# Patient Record
Sex: FEMALE | Race: WHITE | NOT HISPANIC OR LATINO | Employment: FULL TIME | URBAN - METROPOLITAN AREA
[De-identification: names, ages, dates, MRNs, and addresses within clinical notes are randomized per-mention and may not be internally consistent; named-entity substitution may affect disease eponyms.]

---

## 2019-07-26 ENCOUNTER — HOSPITAL ENCOUNTER (EMERGENCY)
Facility: CLINIC | Age: 39
Discharge: HOME | End: 2019-07-26
Attending: EMERGENCY MEDICINE | Admitting: EMERGENCY MEDICINE
Payer: COMMERCIAL

## 2019-07-26 ENCOUNTER — APPOINTMENT (OUTPATIENT)
Dept: GENERAL RADIOLOGY | Facility: CLINIC | Age: 39
End: 2019-07-26
Attending: EMERGENCY MEDICINE
Payer: COMMERCIAL

## 2019-07-26 VITALS
RESPIRATION RATE: 18 BRPM | HEIGHT: 67 IN | SYSTOLIC BLOOD PRESSURE: 131 MMHG | BODY MASS INDEX: 25.11 KG/M2 | WEIGHT: 160 LBS | OXYGEN SATURATION: 95 % | HEART RATE: 78 BPM | TEMPERATURE: 98.1 F | DIASTOLIC BLOOD PRESSURE: 77 MMHG

## 2019-07-26 DIAGNOSIS — R07.89 ATYPICAL CHEST PAIN: ICD-10-CM

## 2019-07-26 LAB
ALBUMIN SERPL-MCNC: 3.6 G/DL (ref 3.4–5)
ALP SERPL-CCNC: 63 U/L (ref 40–150)
ALT SERPL W P-5'-P-CCNC: 11 U/L (ref 0–50)
ANION GAP SERPL CALCULATED.3IONS-SCNC: 7 MMOL/L (ref 3–14)
AST SERPL W P-5'-P-CCNC: 11 U/L (ref 0–45)
BASOPHILS # BLD AUTO: 0.1 10E9/L (ref 0–0.2)
BASOPHILS NFR BLD AUTO: 0.7 %
BILIRUB SERPL-MCNC: 0.4 MG/DL (ref 0.2–1.3)
BUN SERPL-MCNC: 16 MG/DL (ref 7–30)
CALCIUM SERPL-MCNC: 8.8 MG/DL (ref 8.5–10.1)
CHLORIDE SERPL-SCNC: 109 MMOL/L (ref 94–109)
CO2 SERPL-SCNC: 26 MMOL/L (ref 20–32)
CREAT SERPL-MCNC: 0.82 MG/DL (ref 0.52–1.04)
D DIMER PPP FEU-MCNC: 0.3 UG/ML FEU (ref 0–0.5)
DIFFERENTIAL METHOD BLD: NORMAL
EOSINOPHIL # BLD AUTO: 0.1 10E9/L (ref 0–0.7)
EOSINOPHIL NFR BLD AUTO: 0.9 %
ERYTHROCYTE [DISTWIDTH] IN BLOOD BY AUTOMATED COUNT: 13.1 % (ref 10–15)
GFR SERPL CREATININE-BSD FRML MDRD: 90 ML/MIN/{1.73_M2}
GLUCOSE SERPL-MCNC: 105 MG/DL (ref 70–99)
HCT VFR BLD AUTO: 41.1 % (ref 35–47)
HGB BLD-MCNC: 13.1 G/DL (ref 11.7–15.7)
IMM GRANULOCYTES # BLD: 0 10E9/L (ref 0–0.4)
IMM GRANULOCYTES NFR BLD: 0.4 %
INR PPP: 1 (ref 0.86–1.14)
INTERPRETATION ECG - MUSE: NORMAL
INTERPRETATION ECG - MUSE: NORMAL
LYMPHOCYTES # BLD AUTO: 2.2 10E9/L (ref 0.8–5.3)
LYMPHOCYTES NFR BLD AUTO: 27.5 %
MAGNESIUM SERPL-MCNC: 2.1 MG/DL (ref 1.6–2.3)
MCH RBC QN AUTO: 28.1 PG (ref 26.5–33)
MCHC RBC AUTO-ENTMCNC: 31.9 G/DL (ref 31.5–36.5)
MCV RBC AUTO: 88 FL (ref 78–100)
MONOCYTES # BLD AUTO: 0.4 10E9/L (ref 0–1.3)
MONOCYTES NFR BLD AUTO: 4.4 %
NEUTROPHILS # BLD AUTO: 5.4 10E9/L (ref 1.6–8.3)
NEUTROPHILS NFR BLD AUTO: 66.1 %
NRBC # BLD AUTO: 0 10*3/UL
NRBC BLD AUTO-RTO: 0 /100
PLATELET # BLD AUTO: 320 10E9/L (ref 150–450)
POTASSIUM SERPL-SCNC: 3.6 MMOL/L (ref 3.4–5.3)
PROT SERPL-MCNC: 7.5 G/DL (ref 6.8–8.8)
RBC # BLD AUTO: 4.66 10E12/L (ref 3.8–5.2)
SODIUM SERPL-SCNC: 142 MMOL/L (ref 133–144)
TROPONIN I SERPL-MCNC: <0.015 UG/L (ref 0–0.04)
TROPONIN I SERPL-MCNC: <0.015 UG/L (ref 0–0.04)
WBC # BLD AUTO: 8.1 10E9/L (ref 4–11)

## 2019-07-26 PROCEDURE — 85379 FIBRIN DEGRADATION QUANT: CPT | Performed by: EMERGENCY MEDICINE

## 2019-07-26 PROCEDURE — 96361 HYDRATE IV INFUSION ADD-ON: CPT

## 2019-07-26 PROCEDURE — 83735 ASSAY OF MAGNESIUM: CPT | Performed by: EMERGENCY MEDICINE

## 2019-07-26 PROCEDURE — 93005 ELECTROCARDIOGRAM TRACING: CPT

## 2019-07-26 PROCEDURE — 80053 COMPREHEN METABOLIC PANEL: CPT | Performed by: EMERGENCY MEDICINE

## 2019-07-26 PROCEDURE — 96360 HYDRATION IV INFUSION INIT: CPT

## 2019-07-26 PROCEDURE — 84484 ASSAY OF TROPONIN QUANT: CPT | Mod: 91 | Performed by: EMERGENCY MEDICINE

## 2019-07-26 PROCEDURE — 99285 EMERGENCY DEPT VISIT HI MDM: CPT | Mod: 25

## 2019-07-26 PROCEDURE — 85610 PROTHROMBIN TIME: CPT | Performed by: EMERGENCY MEDICINE

## 2019-07-26 PROCEDURE — 85025 COMPLETE CBC W/AUTO DIFF WBC: CPT | Performed by: EMERGENCY MEDICINE

## 2019-07-26 PROCEDURE — 71046 X-RAY EXAM CHEST 2 VIEWS: CPT

## 2019-07-26 PROCEDURE — 25000128 H RX IP 250 OP 636: Performed by: EMERGENCY MEDICINE

## 2019-07-26 PROCEDURE — 93005 ELECTROCARDIOGRAM TRACING: CPT | Mod: 76

## 2019-07-26 RX ORDER — ETONOGESTREL AND ETHINYL ESTRADIOL VAGINAL RING .015; .12 MG/D; MG/D
1 RING VAGINAL
COMMUNITY
End: 2023-09-10

## 2019-07-26 RX ADMIN — SODIUM CHLORIDE 1000 ML: 9 INJECTION, SOLUTION INTRAVENOUS at 10:28

## 2019-07-26 ASSESSMENT — ENCOUNTER SYMPTOMS
FATIGUE: 1
APPETITE CHANGE: 1
FEVER: 0
SHORTNESS OF BREATH: 1
WEAKNESS: 1
ACTIVITY CHANGE: 1
DIZZINESS: 1
NAUSEA: 0
VOMITING: 0

## 2019-07-26 ASSESSMENT — MIFFLIN-ST. JEOR: SCORE: 1438.39

## 2019-07-26 NOTE — ED TRIAGE NOTES
Presents with 2 week hx of chest pressure, weakness, shortness of breath and fatigue.  States travels a lot and has a lot of stress.  ABCD intact

## 2019-07-26 NOTE — ED PROVIDER NOTES
History     Chief Complaint:  Chest Pain and Shortness of Breath    HPI   Bernardo Burgos is a 38 year old female who presents with chest pain and shortness of breath. The patient reports mid sternal chest pressure for the past several weeks. She notes the pain was initially mild but has been getting worse. The patient notes it feels like how her chest felt after she had recovered from pneumonia. The patient notes she has been stressed at work, and she states 'she tends to carry stress in her chest.' The patient notes rest and Ibuprofen seem to resolve her pain. The patient notes some shortness of breath when talking and exercising. She also notes weakness in her legs, mild dizziness, fatigue, and loss of appetite. The patient called her primary care physician nursing line and was told to come to the ED. The patient denies vomiting, nausea, or fever.  Of note, the patient is on her menstrual cycle last week and started using a Nuvaring 2 months ago. She also reports she travels a lot for work, and has been sleeping in different hotels leading to loss of sleep.      CARDIAC RISK FACTORS:  Tobacco:   No  Hypertension:   No  Hyperlipidemia:  No  Diabetes:   No  Family History:  Yes grandfather in 50s    PE/DVT RISK FACTORS:  Hormones:   Yes  Tobacco:   No  Cancer:   No  Travel:   Yes  Family history:  No    Allergies:  No known allergies.     Medications:    Nuvaring    Past Medical History:    UTI  Varicella  Pneumonia    Past Surgical History:    C section  [place] teeth  Tubal ligation    Family History:    Mother: depression, bipolar  Maternal grandfather: heart disease, stroke    Social History:  Smoking Status: Never Smoker  Smokeless Tobacco: Never Used  Alcohol Use: Positive    Drug use: No    Review of Systems   Constitutional: Positive for activity change (less sleep), appetite change and fatigue. Negative for fever.   Respiratory: Positive for shortness of breath.    Cardiovascular: Positive for chest  "pain.   Gastrointestinal: Negative for nausea and vomiting.   Neurological: Positive for dizziness and weakness (in legs).   All other systems reviewed and are negative.    Physical Exam     Patient Vitals for the past 24 hrs:   BP Temp Temp src Pulse Heart Rate Resp SpO2 Height Weight   07/26/19 1230 126/68 -- -- 75 78 -- 96 % -- --   07/26/19 1215 124/71 -- -- 77 75 -- 97 % -- --   07/26/19 1200 127/64 -- -- 83 78 -- 97 % -- --   07/26/19 1145 124/59 -- -- 76 77 -- 97 % -- --   07/26/19 1130 126/77 -- -- 84 83 -- 99 % -- --   07/26/19 1115 126/76 -- -- 93 80 -- 98 % -- --   07/26/19 1100 117/66 -- -- 83 101 -- (!) 75 % -- --   07/26/19 1045 -- -- -- -- 89 -- 100 % -- --   07/26/19 1030 131/66 -- -- 87 91 -- 99 % -- --   07/26/19 1015 165/84 -- -- 99 -- -- 100 % -- --   07/26/19 1013 (!) 170/93 98.1  F (36.7  C) Oral 104 -- 18 99 % 1.702 m (5' 7\") 72.6 kg (160 lb)     Physical Exam   Constitutional: She appears well-developed and well-nourished.   HENT:   Right Ear: External ear normal.   Left Ear: External ear normal.   Mouth/Throat: Oropharynx is clear and moist. No oropharyngeal exudate.   TM's clear bilaterally   Eyes: Pupils are equal, round, and reactive to light. Conjunctivae are normal. No scleral icterus.   Neck: Normal range of motion. Neck supple. No JVD present.   Cardiovascular: Regular rhythm, normal heart sounds and intact distal pulses. Tachycardia present. Exam reveals no gallop and no friction rub.   No murmur heard.  Pulmonary/Chest: Effort normal and breath sounds normal. No respiratory distress. She has no wheezes. She has no rales.   Abdominal: Soft. Bowel sounds are normal. She exhibits no distension and no mass. There is no tenderness.   Musculoskeletal: Normal range of motion. She exhibits no edema.   Lymphadenopathy:     She has no cervical adenopathy.   Neurological: She is alert. No cranial nerve deficit.   Skin: Skin is warm and dry. No rash noted.   Psychiatric: She has a normal mood " and affect.     Emergency Department Course   ECG:  ECG taken at 1015, ECG read at 1022  Normal sinus rhythm with sinus arrhythmia  Nonspecific ST abnormality  Abnormal ECG  Rate 98 bpm. MN interval 114 ms. QRS duration 78 ms. QT/QTc 334/426 ms. P-R-T axes 66 4 4.    ECG:  ECG taken at 1206, ECG read at 1208  Normal sinus rhythm  Normal ECG  Rate 79 bpm. MN interval 114 ms. QRS duration 80 ms. QT/QTc 366/419 ms. P-R-T axes 34 1 -3.    Imaging:  Radiology findings were communicated with the patient who voiced understanding of the findings.    XR Chest  Normal two views of the chest.   CHELSY CASE MD    Laboratory:  Laboratory findings were communicated with the patient who voiced understanding of the findings.    CBC: WBC 8.1, HGB 13.1,   CMP: glucose 105(H) o/w WNL (Creatinine 0.82)  D Dimer (Collected 1025): 0.3  INR:1.00  Troponin (Collected 1025): <0.015  Magnesium: 2.1    Troponin (Collected 1219): <0.015    Interventions:  1028 NS 1000 mL IV    Emergency Department Course:  Nursing notes and vitals reviewed.    1015 I performed an exam of the patient as documented above.     1025 IV was inserted and blood was drawn for laboratory testing, results above.    1046 The patient was sent for a XR Chest while in the emergency department, results above.     1114 I returned to update the patient.    1219 Repeat troponin obtained, results above.     1313 Findings and plan explained to the Patient. Patient discharged home with instructions regarding supportive care, medications, and reasons to return. The importance of close follow-up was reviewed.     Impression & Plan    Medical Decision Making:  Bernardo Burgos is a 38 year old female who presents to the emergency department today for evaluation of 2 weeks of chest tightness and pain and has been feeling weak. She has been under a lot of stress and does feel that this is contributing. Workup so far is negative including initial troponin which likely  rules out this being cardiac given how long her duration of pain has been. There was some slight abnormality on her EKG mainly with possible ST depression. The EKG was preformed when she was very hypertensive on arrival. I repeated her EKG when she become more calm with a normal blood pressure and it is completely normal now. I did get a second troponin which is negative still which certainly rules out this being cardiac. I discussed setting her up with an outpatient stress test as well as a blood pressure monitor she can use at home. She was given instructions for how to check her blood pressure. She is comfortable with the plan and will follow up with primary care physician.     Diagnosis:    ICD-10-CM    1. Atypical chest pain R07.89 Echo Stress Echocardiogram        Disposition:   The patient is discharged to home.    Discharge Medications:  No discharge medication.     Scribe Disclosure:  I, Marilynn Richardson, am serving as a scribe at 10:10 AM on 7/26/2019 to document services personally performed by Prerna Stephens MD based on my observations and the provider's statements to me.  [facility] EMERGENCY DEPARTMENT       Prerna Stephens MD  07/26/19 0887

## 2019-07-26 NOTE — ED AVS SNAPSHOT
[facility] Emergency Department  [address]  [facility] [address]  Phone:  [phone]  Fax:  [phone]                                    Bernardo Burgos   MRN: [id number]    Department:  [facility] Emergency Department   Date of Visit:  7/26/2019           After Visit Summary Signature Page    I have received my discharge instructions, and my questions have been answered. I have discussed any challenges I see with this plan with the nurse or doctor.    ..........................................................................................................................................  Patient/Patient Representative Signature      ..........................................................................................................................................  Patient Representative Print Name and Relationship to Patient    ..................................................               ................................................  Date                                   Time    ..........................................................................................................................................  Reviewed by Signature/Title    ...................................................              ..............................................  Date                                               Time          22EPIC Rev 08/18

## 2019-07-30 ENCOUNTER — HOSPITAL ENCOUNTER (OUTPATIENT)
Dept: CARDIOLOGY | Facility: CLINIC | Age: 39
Discharge: HOME | End: 2019-07-30
Attending: EMERGENCY MEDICINE | Admitting: EMERGENCY MEDICINE
Payer: COMMERCIAL

## 2019-07-30 DIAGNOSIS — R07.89 ATYPICAL CHEST PAIN: ICD-10-CM

## 2019-07-30 PROCEDURE — 93325 DOPPLER ECHO COLOR FLOW MAPG: CPT | Mod: 26 | Performed by: INTERNAL MEDICINE

## 2019-07-30 PROCEDURE — 93350 STRESS TTE ONLY: CPT | Mod: TC

## 2019-07-30 PROCEDURE — 93321 DOPPLER ECHO F-UP/LMTD STD: CPT | Mod: 26 | Performed by: INTERNAL MEDICINE

## 2019-07-30 PROCEDURE — 93350 STRESS TTE ONLY: CPT | Mod: 26 | Performed by: INTERNAL MEDICINE

## 2019-07-30 PROCEDURE — 93016 CV STRESS TEST SUPVJ ONLY: CPT | Performed by: INTERNAL MEDICINE

## 2019-07-30 PROCEDURE — 93018 CV STRESS TEST I&R ONLY: CPT | Performed by: INTERNAL MEDICINE

## 2019-09-26 ENCOUNTER — HOSPITAL ENCOUNTER (OUTPATIENT)
Dept: ULTRASOUND IMAGING | Facility: CLINIC | Age: 39
Discharge: HOME | End: 2019-09-26
Attending: INTERNAL MEDICINE | Admitting: INTERNAL MEDICINE
Payer: COMMERCIAL

## 2019-09-26 DIAGNOSIS — R07.9 CHEST PAIN, UNSPECIFIED TYPE: ICD-10-CM

## 2019-09-26 DIAGNOSIS — R10.11 RUQ PAIN: ICD-10-CM

## 2019-09-26 DIAGNOSIS — R14.0 BLOATING: ICD-10-CM

## 2019-09-26 PROCEDURE — 76705 ECHO EXAM OF ABDOMEN: CPT

## 2019-10-31 ENCOUNTER — HOSPITAL ENCOUNTER (OUTPATIENT)
Facility: CLINIC | Age: 39
Setting detail: OBSERVATION
Discharge: HOME | End: 2019-11-01
Attending: EMERGENCY MEDICINE | Admitting: HOSPITALIST
Payer: COMMERCIAL

## 2019-10-31 DIAGNOSIS — R19.7 BLOODY DIARRHEA: ICD-10-CM

## 2019-10-31 DIAGNOSIS — R53.83 OTHER FATIGUE: ICD-10-CM

## 2019-10-31 LAB
ALBUMIN SERPL-MCNC: 3 G/DL (ref 3.4–5)
ALP SERPL-CCNC: 92 U/L (ref 40–150)
ALT SERPL W P-5'-P-CCNC: 10 U/L (ref 0–50)
ANION GAP SERPL CALCULATED.3IONS-SCNC: 8 MMOL/L (ref 3–14)
AST SERPL W P-5'-P-CCNC: 25 U/L (ref 0–45)
BASOPHILS # BLD AUTO: 0 10E9/L (ref 0–0.2)
BASOPHILS NFR BLD AUTO: 0.6 %
BILIRUB SERPL-MCNC: 0.4 MG/DL (ref 0.2–1.3)
BUN SERPL-MCNC: 8 MG/DL (ref 7–30)
C DIFF TOX B STL QL: NEGATIVE
CALCIUM SERPL-MCNC: 8.7 MG/DL (ref 8.5–10.1)
CHLORIDE SERPL-SCNC: 98 MMOL/L (ref 94–109)
CO2 SERPL-SCNC: 26 MMOL/L (ref 20–32)
CREAT SERPL-MCNC: 0.99 MG/DL (ref 0.52–1.04)
DIFFERENTIAL METHOD BLD: NORMAL
EOSINOPHIL # BLD AUTO: 0.1 10E9/L (ref 0–0.7)
EOSINOPHIL NFR BLD AUTO: 1.4 %
ERYTHROCYTE [DISTWIDTH] IN BLOOD BY AUTOMATED COUNT: 11.9 % (ref 10–15)
GFR SERPL CREATININE-BSD FRML MDRD: 72 ML/MIN/{1.73_M2}
GLUCOSE BLDC GLUCOMTR-MCNC: 93 MG/DL (ref 70–99)
GLUCOSE SERPL-MCNC: 93 MG/DL (ref 70–99)
HCT VFR BLD AUTO: 43.2 % (ref 35–47)
HEMOCCULT STL QL: POSITIVE
HGB BLD-MCNC: 14.1 G/DL (ref 11.7–15.7)
IMM GRANULOCYTES # BLD: 0 10E9/L (ref 0–0.4)
IMM GRANULOCYTES NFR BLD: 0.2 %
LYMPHOCYTES # BLD AUTO: 2.2 10E9/L (ref 0.8–5.3)
LYMPHOCYTES NFR BLD AUTO: 33.4 %
MCH RBC QN AUTO: 27.4 PG (ref 26.5–33)
MCHC RBC AUTO-ENTMCNC: 32.6 G/DL (ref 31.5–36.5)
MCV RBC AUTO: 84 FL (ref 78–100)
MONOCYTES # BLD AUTO: 0.8 10E9/L (ref 0–1.3)
MONOCYTES NFR BLD AUTO: 12.7 %
NEUTROPHILS # BLD AUTO: 3.3 10E9/L (ref 1.6–8.3)
NEUTROPHILS NFR BLD AUTO: 51.7 %
NRBC # BLD AUTO: 0 10*3/UL
NRBC BLD AUTO-RTO: 0 /100
PLATELET # BLD AUTO: 368 10E9/L (ref 150–450)
POTASSIUM SERPL-SCNC: 3.5 MMOL/L (ref 3.4–5.3)
PROT SERPL-MCNC: 7.4 G/DL (ref 6.8–8.8)
RBC # BLD AUTO: 5.15 10E12/L (ref 3.8–5.2)
SODIUM SERPL-SCNC: 132 MMOL/L (ref 133–144)
SPECIMEN SOURCE: NORMAL
WBC # BLD AUTO: 6.4 10E9/L (ref 4–11)

## 2019-10-31 PROCEDURE — 25000128 H RX IP 250 OP 636: Performed by: EMERGENCY MEDICINE

## 2019-10-31 PROCEDURE — 85025 COMPLETE CBC W/AUTO DIFF WBC: CPT | Performed by: EMERGENCY MEDICINE

## 2019-10-31 PROCEDURE — 99285 EMERGENCY DEPT VISIT HI MDM: CPT | Mod: 25

## 2019-10-31 PROCEDURE — 96361 HYDRATE IV INFUSION ADD-ON: CPT

## 2019-10-31 PROCEDURE — 87493 C DIFF AMPLIFIED PROBE: CPT | Performed by: EMERGENCY MEDICINE

## 2019-10-31 PROCEDURE — G0378 HOSPITAL OBSERVATION PER HR: HCPCS

## 2019-10-31 PROCEDURE — 00000146 ZZHCL STATISTIC GLUCOSE BY METER IP

## 2019-10-31 PROCEDURE — 87209 SMEAR COMPLEX STAIN: CPT | Performed by: EMERGENCY MEDICINE

## 2019-10-31 PROCEDURE — 87177 OVA AND PARASITES SMEARS: CPT | Performed by: EMERGENCY MEDICINE

## 2019-10-31 PROCEDURE — 80053 COMPREHEN METABOLIC PANEL: CPT | Performed by: EMERGENCY MEDICINE

## 2019-10-31 PROCEDURE — 87506 IADNA-DNA/RNA PROBE TQ 6-11: CPT | Performed by: EMERGENCY MEDICINE

## 2019-10-31 PROCEDURE — 25800030 ZZH RX IP 258 OP 636: Performed by: EMERGENCY MEDICINE

## 2019-10-31 PROCEDURE — 25800030 ZZH RX IP 258 OP 636: Performed by: HOSPITALIST

## 2019-10-31 PROCEDURE — 96360 HYDRATION IV INFUSION INIT: CPT

## 2019-10-31 PROCEDURE — 82272 OCCULT BLD FECES 1-3 TESTS: CPT | Performed by: EMERGENCY MEDICINE

## 2019-10-31 PROCEDURE — 99220 ZZC INITIAL OBSERVATION CARE,LEVL III: CPT | Performed by: HOSPITALIST

## 2019-10-31 RX ORDER — ONDANSETRON 4 MG/1
4 TABLET, ORALLY DISINTEGRATING ORAL EVERY 6 HOURS PRN
Status: DISCONTINUED | OUTPATIENT
Start: 2019-10-31 | End: 2019-11-01 | Stop reason: HOSPADM

## 2019-10-31 RX ORDER — NALOXONE HYDROCHLORIDE 0.4 MG/ML
.1-.4 INJECTION, SOLUTION INTRAMUSCULAR; INTRAVENOUS; SUBCUTANEOUS
Status: DISCONTINUED | OUTPATIENT
Start: 2019-10-31 | End: 2019-11-01 | Stop reason: HOSPADM

## 2019-10-31 RX ORDER — SODIUM CHLORIDE, SODIUM LACTATE, POTASSIUM CHLORIDE, CALCIUM CHLORIDE 600; 310; 30; 20 MG/100ML; MG/100ML; MG/100ML; MG/100ML
INJECTION, SOLUTION INTRAVENOUS CONTINUOUS
Status: DISCONTINUED | OUTPATIENT
Start: 2019-10-31 | End: 2019-11-01 | Stop reason: HOSPADM

## 2019-10-31 RX ORDER — LANSOPRAZOLE 30 MG/1
30 CAPSULE, DELAYED RELEASE ORAL
COMMUNITY
Start: 2019-11-01

## 2019-10-31 RX ORDER — ACETAMINOPHEN 325 MG/1
650 TABLET ORAL EVERY 4 HOURS PRN
Status: DISCONTINUED | OUTPATIENT
Start: 2019-10-31 | End: 2019-11-01 | Stop reason: HOSPADM

## 2019-10-31 RX ORDER — ONDANSETRON 2 MG/ML
4 INJECTION INTRAMUSCULAR; INTRAVENOUS EVERY 6 HOURS PRN
Status: DISCONTINUED | OUTPATIENT
Start: 2019-10-31 | End: 2019-11-01 | Stop reason: HOSPADM

## 2019-10-31 RX ORDER — LOPERAMIDE HYDROCHLORIDE AND SIMETHICONE 2; 125 MG/1; MG/1
1 TABLET ORAL 2 TIMES DAILY PRN
COMMUNITY
End: 2023-09-10

## 2019-10-31 RX ORDER — LIDOCAINE 40 MG/G
CREAM TOPICAL
Status: DISCONTINUED | OUTPATIENT
Start: 2019-10-31 | End: 2019-11-01 | Stop reason: HOSPADM

## 2019-10-31 RX ORDER — MECOBALAMIN 5000 MCG
30 TABLET,DISINTEGRATING ORAL
Status: DISCONTINUED | OUTPATIENT
Start: 2019-11-01 | End: 2019-11-01 | Stop reason: HOSPADM

## 2019-10-31 RX ORDER — ACETAMINOPHEN 650 MG/1
650 SUPPOSITORY RECTAL EVERY 4 HOURS PRN
Status: DISCONTINUED | OUTPATIENT
Start: 2019-10-31 | End: 2019-11-01 | Stop reason: HOSPADM

## 2019-10-31 RX ADMIN — SODIUM CHLORIDE, POTASSIUM CHLORIDE, SODIUM LACTATE AND CALCIUM CHLORIDE: 600; 310; 30; 20 INJECTION, SOLUTION INTRAVENOUS at 21:55

## 2019-10-31 RX ADMIN — DEXTROSE AND SODIUM CHLORIDE 1000 ML: 5; 900 INJECTION, SOLUTION INTRAVENOUS at 17:24

## 2019-10-31 RX ADMIN — SODIUM CHLORIDE 1000 ML: 9 INJECTION, SOLUTION INTRAVENOUS at 14:48

## 2019-10-31 ASSESSMENT — ENCOUNTER SYMPTOMS
BLOOD IN STOOL: 1
VOMITING: 1
DIZZINESS: 1
NAUSEA: 1
WEAKNESS: 1
MYALGIAS: 1
FEVER: 0
DIARRHEA: 1

## 2019-10-31 NOTE — ED NOTES
[facility]  ED Nurse Handoff Report    Bernardo Burgos is a 39 year old female   ED Chief complaint: diarrhea x 1 week, blood today  . ED Diagnosis:   Final diagnoses:   Bloody diarrhea   Other fatigue     Allergies: No Known Allergies    Code Status: Full Code  Activity level - Baseline/Home:  Independent. Activity Level - Current:   Stand by Assist. Lift room needed: No. Bariatric: No   Needed: No   Isolation: No. Infection: Not Applicable.     Vital Signs:   Vitals:    10/31/19 1404 10/31/19 1515 10/31/19 1730   BP: (!) 141/82 131/87 119/77   Pulse: 116 89 88   Resp: 20     Temp: 96.4  F (35.8  C)     TempSrc: Temporal     SpO2: 95% 99% 98%       Cardiac Rhythm:  ,      Pain level: 0-10 Pain Scale: 1  Patient confused: No. Patient Falls Risk: Yes.   Elimination Status: Has voided   Patient Report - Initial Complaint: diarrhea. Focused Assessment: Gastrointestinal - GI WDL: all  Nausea/Vomiting Signs/Symptoms: nausea intermittent; emesis  Stool Color: red, bright  GI Signs/Symptoms: diarrhea; nausea; vomiting   Tests Performed: labs. Abnormal Results:   Labs Ordered and Resulted from Time of ED Arrival Up to the Time of Departure from the ED   COMPREHENSIVE METABOLIC PANEL - Abnormal; Notable for the following components:       Result Value    Sodium 132 (*)     Albumin 3.0 (*)     All other components within normal limits   OCCULT BLOOD STOOL - Abnormal; Notable for the following components:    Occult Blood Positive (*)     All other components within normal limits   CBC WITH PLATELETS DIFFERENTIAL   GLUCOSE BY METER   PERIPHERAL IV CATHETER   ENTERIC BACTERIA AND VIRUS PANEL BY [facility] STOOL   CLOSTRIDIUM DIFFICILE TOXIN B   OVA AND PARASITE EXAM ROUTINE     .   Treatments provided: fluids  Family Comments: spouse was present, since left  OBS brochure/video discussed/provided to patient:  Yes  ED Medications:   Medications   0.9% sodium chloride BOLUS (1,000 mLs Intravenous New Bag  10/31/19 1448)   dextrose 5% and 0.9% NaCl infusion (1,000 mLs Intravenous New Bag 10/31/19 0594)     Drips infusing:  No  For the majority of the shift, the patient's behavior Green. Interventions performed were .     Severe Sepsis OR Septic Shock Diagnosis Present: No      ED Nurse Name/Phone Number: Alicia Morales RN,   6:55 PM    RECEIVING UNIT ED HANDOFF REVIEW    Above ED Nurse Handoff Report was reviewed: Yes  Reviewed by: Dale Glez RN on October 31, 2019 at 7:29 PM

## 2019-10-31 NOTE — ED PROVIDER NOTES
History     Chief Complaint:  Diarrhea; Hematochezia    HPI   Bernardo Burgos is a 39 year old female with a history of IBS who presents with  for evaluation of greater than 10 episodes a day of diarrhea, associated with abdominal cramping, generalized weakness, dizziness, and hematochezia, that began about 1 week ago. Patient has been traveling a lot for work for the past 2 months and about a month ago she presented to [facility] for chest pain and abdominal pain, and was treated for GERD and IBS. They tested for Crohn's disease, cancer, ulcerative colitis, celiac disease, among others, which all came back as normal. They recommended she had a diet change where she reduced her diary and gluten. 2 weeks ago she went to a cave in [facility]. A day before she began having diarrhea, she had dairy and gluten again. The next day she began having diarrhea in the morning, but attributed this to normal bowel movement. About 4 days later she began having constant diarrhea.  She had another diet change, where she had a mostly liquid diet and drank lots of fluids. She also got over the counter antidiarrhea medication, which has alleviated some of her symptoms. Of note, she also had a stool test ordered, which she turned in yesterday. Last night she had difficulty sleeping due to episodes of diarrhea and abdominal cramping. Her last three episodes of diarrhea have been mostly blood with some mucous. This morning she ate oatmeal and 30 minutes later had an episode of diarrhea, which is an improvement from prior days. Her last three bowel movements have been mostly blood.    Here, she endorses generalized weakness and intermittent episodes of emesis. She denies any black stools, rashes, fever, ill contacts with similar symptoms, and recent travel outside of the country. She does not have a family history of ulcerative colitis or Crohn's disease.    Allergies:  No Known Drug Allergies       Medications:    Nuvaring    Past Medical History:    IBS  GERD    Past Surgical History:     section    Family History:    The patient denies any relevant family medical history.     Social History:  The patient was accompanied to the ED by .  Smoking Status: Never  Smokeless Tobacco: Never  Alcohol Use: No  Drug Use: No   Marital Status:   [2]     Review of Systems   Constitutional: Negative for fever.   Cardiovascular: Positive for chest pain.   Gastrointestinal: Positive for blood in stool, diarrhea, nausea and vomiting.   Musculoskeletal: Positive for myalgias.   Skin: Negative for rash.   Neurological: Positive for dizziness and weakness.   All other systems reviewed and are negative.      Physical Exam     Patient Vitals for the past 24 hrs:   BP Temp Temp src Pulse Resp SpO2   10/31/19 1730 119/77 -- -- 88 -- 98 %   10/31/19 1515 131/87 -- -- 89 -- 99 %   10/31/19 1404 (!) 141/82 96.4  F (35.8  C) Temporal 116 20 95 %      Physical Exam  General: Patient is awake, alert and interactive when I enter the room  Head: The scalp, face, and head appear normal  Eyes: The pupils are equal, round, and reactive to light. Conjunctivae and sclerae are normal  ENT: External acoustic canals are normal. The oropharynx is normal without erythema. Uvula is in the midline. Mucous membranes appear moist.  Neck: Normal range of motion. No anterior cervical lymphadenopathy noted  CV: Tachycardic. S1/S2. No murmurs.   Resp: Lungs are clear without wheezes or rales. No respiratory distress.   GI: Abdomen is soft, no rigidity, guarding, or rebound. No distension. No tenderness to palpation in any quadrant. Hyperactive bowel sounds.    MS: Normal tone. Joints grossly normal without effusions. No asymmetric leg swelling, calf or thigh tenderness.    Skin: No rash or lesions noted. Normal capillary refill noted  Neuro: Speech is normal and fluent. Face is symmetric. Moving all extremities.   Psych:  Normal  affect.  Appropriate interactions.  Rectal (Chaperoned)   Normal tone   No gross blood   Brown colored stool noted   No external hemorrhoids noted   No anal fissures noted   No stool palpated in rectal vault   Emergency Department Course   Laboratory:  Laboratory findings were communicated with the patient and family who voiced understanding of the findings.    CBC: AWNL (WBC 6.4, HGB 14.1, )  CMP:  (L), Albumin 3.0 (L) o/w WNL (Creatinine 0.99)   Glucose by Meter (Collected 1633): 93     Stool Culture: Pending  Ova & Parasite: Pending  Enteric bacteria and virus panel by [facility] Stool: Pending  Occult Blood: Positive (A)  C. Diff toxin B PCR: pending    Interventions:  1448 0.9% NaCl bolus 1000 mL IV    1724 dextrose 5% and 0.9% NaCl infusion 1000 mL IV     Emergency Department Course:  Nursing notes and vitals reviewed.  IV was inserted and blood was drawn for laboratory testing, results above.  A stool sample was collected and underwent laboratory testing, findings above.     (1452)   I performed an exam of the patient as documented above. History obtained from patient.    (1545)   I performed a rectal exam, as noted above.    (1658)   I rechecked patient.    (1835)   I rechecked patient and updated her with results.    (1853)   I spoke with Dr. Graves of the Hospitalist service regarding patient's presentation, findings, and plan of care.     Findings and plan explained to the Patient who consents to admission. Discussed the patient with Dr. Graves, who will admit the patient to a obs bed for further monitoring, evaluation, and treatment.     Impression & Plan      Medical Decision Making:  Patient is a 29-year-old female who presents to the emergency department today with 10 days of diarrhea and 3 days of hematochezia.  Upon initial evaluation she is tachycardic but otherwise hemodynamically stable.  She is afebrile.  On physical exam the patient does not have any significant abdominal tenderness.  She  does have guaiac positive stool but no ivelisse bleeding from her rectum.  There is no history of recent travel, antibiotic use, significant fevers or any red flag signs with her diarrhea.  Her blood work shows some mild hyponatremia but no other significant electrolyte abnormalities.  She does not have an elevated white blood cell count.  Given the duration of her symptoms I did elect to perform stool studies.  Unfortunately the patient does have some orthostatic symptoms of dizziness when she is standing and transferring to the commode.  At this point she feels unsafe going home and will require admission for further IV fluid management and awaiting her stool studies.    Diagnosis:    ICD-10-CM    1. Bloody diarrhea R19.7    2. Other fatigue R53.83       Disposition:   Admission.    Scribe Disclosure:  I, Ambrosio Rey, am serving as a scribe at 3:01 PM on 10/31/2019 to document services personally performed by Clayton Iqbal MD based on my observations and the provider's statements to me.  10/31/2019   [facility] EMERGENCY DEPARTMENT       Clayton Iqbal MD  11/01/19 0007

## 2019-10-31 NOTE — H&P
[facility]    History and Physical  Hospitalist       Date of Admission:  10/31/2019    Assessment & Plan   Bernardo Burgos is a 39 year old female with history of IBS who presents with diarrhea has been going on for approximately 1 week.      #Diarrhea: Concerned that diarrhea is secondary to infectious etiology including C. difficile versus enteric virus versus other bacterial infection.  Could also be related to her known IBS as she did start to have symptoms after moving off of her strict FODMAP diet.  Diarrhea was initially watery in quality, it has increased in quantity and severity over the last several days.  She and having over 10 episodes per day.  Caused her to feel generally weak and dizzy upon standing.  She did start to notice some blood in her stool over the last day, stool occult blood is positive in the ED.  She is, thankfully, hemodynamically stable and hemoglobin is within normal limits indicating that this is not likely a large amount of blood loss.    -We will follow-up on infectious work-up including C. difficile, GI enteric panel and also ova and parasites given that patient did explore [place] approximately 1-1/2 to 2 weeks ago with family.  -We will hold on antibiotics for now pending cultures  -IV fluids at 100 cc/h  -Enteric precautions  -If related to IBS, it may be related to increased stress from her job as well as change in diet.  I did discuss with her the benefit of antidepressant/antianxiety medications in relation IBS.  She states that she has discussed this with GI and will continue to consider it moving forward.  She will likely require close GI follow-up upon discharge.    DVT Prophylaxis: Low Risk/Ambulatory with no VTE prophylaxis indicated  Code Status: Full Code  Expected discharge: Tomorrow, recommended to prior living arrangement once Patient tolerating p.o. intake and generalized weakness is improved.    Milo Graves MD    Primary Care Physician   Alondra  [facility]    Chief Complaint   Diarrhea    History is obtained from the patient, patient's chart and also discussed with emergency room physician.    History of Present Illness   Bernardo Burgos is a 39 year old female with history of IBS who presents with diarrhea has been going on for approximately 1 week.    Patient has IBS and is being followed by [place] [facility].  She has had upper endoscopy performed, has been evaluated for Crohn's, ulcerative colitis, celiac disease which have all been negative.  He was diagnosed with likely IBS.  She was instructed to go on a FODMAP diet which she was following until the day prior to her presentation.  The day before her symptoms started she states that she had dairy and multiple food items that she was not supposed to have including cheesy bread.  The following day on Monday, she started to have watery diarrhea.  Initially she states that the diarrhea was coinciding with when she would eat, but then over the last 2 to 3 days she states that it has been constant with more than 10 episodes per day.  This is caused her to feel generally weak especially when getting up from a laying or seated position.  Initially was watery/mucus in quality.  She states over the last day she has noticed blood.  She states blood is not feeling of the bowl but is cold in the mucus.  She also does endorse nausea with her symptoms.  Of note approximately 1-1/2 weeks ago she did go to [facility] and explored to [place] with her family.  No sick contacts in her family.  Nobody with similar symptoms.  She does that she has had increased stress over the last several months with work projects.    In the ED, patient afebrile and hemodynamically stable.  Labs notable for mild hyponatremia, BMP otherwise unremarkable.  Abdominal labs unremarkable.  CBC unremarkable with normal hemoglobin and white count.  Stool occult blood was collected that was positive.  He was given a 1 L normal  saline bolus along with a bolus of D5 normal saline.    Past Medical History    I have reviewed this patient's medical history and updated it with pertinent information if needed.   IBS  GERD    Past Surgical History       Prior to Admission Medications   Prior to Admission Medications   Prescriptions Last Dose Informant Patient Reported? Taking?   etonogestrel-ethinyl estradiol (NUVARING) 0.12-0.015 MG/24HR vaginal ring   Yes No   Sig: Place 1 each vaginally every 28 days      Facility-Administered Medications: None     Allergies   No Known Allergies    Social History   Lives at home with her  and children.  She works in a law firm.  She is a non-smoker.  Rare drinker.    Family History   Brother with history of colonic polyps.    Review of Systems   The 10 point Review of Systems is negative other than noted in the HPI or here.     Physical Exam   Temp: 96.4  F (35.8  C) Temp src: Temporal BP: 119/77 Pulse: 88   Resp: 20 SpO2: 98 % O2 Device: None (Room air)    Vital Signs with Ranges  Temp:  [96.4  F (35.8  C)] 96.4  F (35.8  C)  Pulse:  [] 88  Resp:  [20] 20  BP: (119-141)/(77-87) 119/77  SpO2:  [95 %-99 %] 98 %  0 lbs 0 oz    Constitutional: Patient lying in bed, no acute distress  HEENT: Mucous membranes appear dry, normocephalic  Respiratory: No work of breathing, clear bilaterally  Cardiovascular: Regular no murmur  GI: Sounds present, no distention noted, soft.  Some mild tenderness to deep palpation in all quadrants seemingly worse on the right side.  No rebound or guarding  Lymph/Hematologic: No bruising noted  Skin: No Rashes noted  Musculoskeletal: Normal tone  Neurologic: Cranial nerves II through XII intact, moves all extremities, no tremor  Psychiatric: Appropriate    Data   Data reviewed today:    Recent Labs   Lab 10/31/19  1449   WBC 6.4   HGB 14.1   MCV 84      *   POTASSIUM 3.5   CHLORIDE 98   CO2 26   BUN 8   CR 0.99   ANIONGAP 8   FANY 8.7   GLC 93   ALBUMIN  3.0*   PROTTOTAL 7.4   BILITOTAL 0.4   ALKPHOS 92   ALT 10   AST 25       No results found for this or any previous visit (from the past 24 hour(s)).

## 2019-10-31 NOTE — ED TRIAGE NOTES
Diarrhea x 1 week, today noted blood.  Has talked to clinic this week.  Is seeing GI for IBS.  ABCDs intact.

## 2019-11-01 VITALS
OXYGEN SATURATION: 96 % | HEART RATE: 89 BPM | SYSTOLIC BLOOD PRESSURE: 129 MMHG | TEMPERATURE: 98.7 F | WEIGHT: 158.3 LBS | RESPIRATION RATE: 16 BRPM | DIASTOLIC BLOOD PRESSURE: 64 MMHG | BODY MASS INDEX: 24.79 KG/M2

## 2019-11-01 LAB
ANION GAP SERPL CALCULATED.3IONS-SCNC: 5 MMOL/L (ref 3–14)
BUN SERPL-MCNC: 5 MG/DL (ref 7–30)
C COLI+JEJUNI+LARI FUSA STL QL NAA+PROBE: NOT DETECTED
CALCIUM SERPL-MCNC: 7.9 MG/DL (ref 8.5–10.1)
CHLORIDE SERPL-SCNC: 107 MMOL/L (ref 94–109)
CO2 SERPL-SCNC: 27 MMOL/L (ref 20–32)
CREAT SERPL-MCNC: 0.84 MG/DL (ref 0.52–1.04)
EC STX1 GENE STL QL NAA+PROBE: NOT DETECTED
EC STX2 GENE STL QL NAA+PROBE: NOT DETECTED
ENTERIC PATHOGEN COMMENT: NORMAL
ERYTHROCYTE [DISTWIDTH] IN BLOOD BY AUTOMATED COUNT: 12.1 % (ref 10–15)
GFR SERPL CREATININE-BSD FRML MDRD: 87 ML/MIN/{1.73_M2}
GLUCOSE SERPL-MCNC: 94 MG/DL (ref 70–99)
HCT VFR BLD AUTO: 34.3 % (ref 35–47)
HGB BLD-MCNC: 11 G/DL (ref 11.7–15.7)
MCH RBC QN AUTO: 27.4 PG (ref 26.5–33)
MCHC RBC AUTO-ENTMCNC: 32.1 G/DL (ref 31.5–36.5)
MCV RBC AUTO: 86 FL (ref 78–100)
NOROV GI+II ORF1-ORF2 JNC STL QL NAA+PR: NOT DETECTED
O+P STL MICRO: NORMAL
PLATELET # BLD AUTO: 305 10E9/L (ref 150–450)
POTASSIUM SERPL-SCNC: 3.5 MMOL/L (ref 3.4–5.3)
RBC # BLD AUTO: 4.01 10E12/L (ref 3.8–5.2)
RVA NSP5 STL QL NAA+PROBE: NOT DETECTED
SALMONELLA SP RPOD STL QL NAA+PROBE: NOT DETECTED
SHIGELLA SP+EIEC IPAH STL QL NAA+PROBE: NOT DETECTED
SODIUM SERPL-SCNC: 139 MMOL/L (ref 133–144)
SPECIMEN SOURCE: NORMAL
V CHOL+PARA RFBL+TRKH+TNAA STL QL NAA+PR: NOT DETECTED
WBC # BLD AUTO: 6.1 10E9/L (ref 4–11)
Y ENTERO RECN STL QL NAA+PROBE: NOT DETECTED

## 2019-11-01 PROCEDURE — G0378 HOSPITAL OBSERVATION PER HR: HCPCS

## 2019-11-01 PROCEDURE — 85027 COMPLETE CBC AUTOMATED: CPT | Performed by: HOSPITALIST

## 2019-11-01 PROCEDURE — 80048 BASIC METABOLIC PNL TOTAL CA: CPT | Performed by: HOSPITALIST

## 2019-11-01 PROCEDURE — 25000132 ZZH RX MED GY IP 250 OP 250 PS 637: Performed by: HOSPITALIST

## 2019-11-01 PROCEDURE — 25800030 ZZH RX IP 258 OP 636: Performed by: HOSPITALIST

## 2019-11-01 PROCEDURE — 99217 ZZC OBSERVATION CARE DISCHARGE: CPT | Performed by: PHYSICIAN ASSISTANT

## 2019-11-01 PROCEDURE — 36415 COLL VENOUS BLD VENIPUNCTURE: CPT | Performed by: HOSPITALIST

## 2019-11-01 RX ADMIN — SODIUM CHLORIDE, POTASSIUM CHLORIDE, SODIUM LACTATE AND CALCIUM CHLORIDE: 600; 310; 30; 20 INJECTION, SOLUTION INTRAVENOUS at 07:39

## 2019-11-01 RX ADMIN — LANSOPRAZOLE 30 MG: 15 CAPSULE, DELAYED RELEASE ORAL at 07:45

## 2019-11-01 NOTE — PLAN OF CARE
PRIMARY DIAGNOSIS: Diarrhea and Generalized weakness     OUTPATIENT/OBSERVATION GOALS TO BE MET BEFORE DISCHARGE  1. Orthostatic performed: No    2. Tolerating PO fluid and/or antibiotics (if applicable): will try to eat this morning     3. Nausea/Vomiting/Diarrhea symptoms improved: No,  watery    4. Pain status: Pain free.    5. Return to near baseline physical activity: Yes    Discharge Planner Nurse   Safe discharge environment identified: Yes  Barriers to discharge: Yes       Entered by: Valeria Gutierrez 11/01/2019 7:53 AM     Please review provider order for any additional goals.   Nurse to notify provider when observation goals have been met and patient is ready for discharge.    Patient is alert and oriented x4. VS WNL and documented on the FS. Lung sounds clear in all lobes and patient is on RA. Denies SOB. Active bowel sounds in all 4 quadrants. Patient stated she had 2 yellow liquid stools and 1 bloody liquid stool overnight. Denies any pain, urgency, and frequency when voiding. Patient denies pain. IV fluids infusing at 100 ml/hr. Gluten free diet and will try to eat breakfast this morning. Independent in room. Patient states nausea comes and goes. Patient states she is feeling much better.     /56 (BP Location: Right arm)   Pulse 80   Temp 98.2  F (36.8  C) (Oral)   Resp 18   Wt 71.8 kg (158 lb 4.8 oz)   LMP 10/14/2019   SpO2 96%   BMI 24.79 kg/m

## 2019-11-01 NOTE — PHARMACY-ADMISSION MEDICATION HISTORY
Admission medication history interview status for this patient is complete. See [facility] admission navigator for allergy information, prior to admission medications and immunization status.     Medication history interview source(s): Patient  Medication history resources: patient   Primary pharmacy:  na    Changes made to PTA medication list:  Added: lansoprazole 30 mg daily, loperamide/ simethicone PRN   Deleted: none   Changed: none     Actions taken by pharmacist (provider contacted, etc): none     Additional medication history information:None    Medication reconciliation/reorder completed by provider prior to medication history? Yes          Prior to Admission medications    Medication Sig Last Dose Taking? Auth Provider   etonogestrel-ethinyl estradiol (NUVARING) 0.12-0.015 MG/24HR vaginal ring Place 1 each vaginally every 28 days  Yes Reported, Patient   LANsoprazole (PREVACID) 30 MG DR capsule Take 30 mg by mouth every morning (before breakfast)  Yes Unknown, Entered By History   Loperamide-Simethicone 2-125 MG TABS Take 1 tablet by mouth 2 times daily as needed  Yes Unknown, Entered By History

## 2019-11-01 NOTE — DISCHARGE SUMMARY
[facility]    Observation Unit  Discharge Summary  Hospitalist    Date of Admission:  10/31/2019  Date of Discharge:  11/1/2019  Provider:  Meenakshi Parrish PA-C  Date of Service (when I last saw the patient): 11/01/19    Discharge Diagnoses    Diarrhea    Other medical issues:  IBS    History of Present Illness   Bernardo Burgos is an 39 year old female with PMH significant for IBS who presents for worsening diarrhea over the past week. Please see the admission history and physical for full details.    Hospital Course   Bernardo Burgos was admitted on 10/31/2019.  The following problems were addressed during her hospitalization:    #Diarrhea: initially concerned that diarrhea was related to infectious etiology but both enteric stool panel and c diff negative. Improved with supportive care with IVFs and slowly restarting her diet. Likely 2/2 veering from strict FODMAP diet recently. Able to tolerate PO prior to discharge without increased diarrhea. She does have mild blood in her stool but likely to irritation from diarrhea over last week. Recommend patient continue her strict diet upon discharge and f/u with her GI provider as previously scheduled.      Pending Results   Unresulted Labs Ordered in the Past 30 Days of this Admission     Date and Time Order Name Status Description    10/31/2019 1600 Ova and Parasite Exam Routine In process         Code Status   Full Code       Primary Care Physician   Alondra Tovar    Exam:    /64 (BP Location: Right arm)   Pulse 89   Temp 98.7  F (37.1  C) (Oral)   Resp 16   Wt 71.8 kg (158 lb 4.8 oz)   LMP 10/14/2019   SpO2 96%   BMI 24.79 kg/m    Constitutional: Patient lying in bed, no acute distress  HEENT: Mucous membranes appear dry, normocephalic  Respiratory: No work of breathing, clear bilaterally  Cardiovascular: RRR, no murmur, rub, or gallop  GI: Sounds present, no distention noted, non tender, soft.    Skin: No Rashes  noted  Musculoskeletal: Normal tone, strength grossly intact in all extremities   Neurologic: Cranial nerves II through XII intact, moves all extremities, no tremor    Discharge Disposition   Discharged to home    Consultations This Hospital Stay   None    Time Spent on this Encounter   I, Sharon Parrish PA-C, personally saw the patient today and spent greater than 30 minutes discharging this patient.    Discharge Orders      Reason for your hospital stay    You were admitted due to concerns for worsening diarrhea. Your workup included basic labs which showed a mildly decreased sodium level otherwise no other electrolyte abnormalities and your infectious workup included clostridium difficile and enteric stool panel which were negative. At time of discharge you had an ova and parasite result pending, unlikely to be positive and the cause for your symptoms but you will be contacted if this is positive. Your hemoglobin did drop during your stay but still stable at time of discharge, if you have ongoing blood in your stool you may need to have this rechecked in follow up. Your diarrhea improved with IV fluids and restarting your FODMAP diet. Your symptoms were likely brought on by going off your previous restrictive diet. Recommend you follow up with gastroenterology as previously scheduled in a few weeks.     Follow-up and recommended labs and tests     Follow up with primary care provider, Alondra Tovar, within 7 days for hospital follow- up.  No follow up labs or test are needed.     Activity    Your activity upon discharge: activity as tolerated     When to contact your care team    Call your primary doctor if you have any of the following: worsening diarrhea or increased blood in your stool.     Full Code     Diet    Follow this diet upon discharge: FODMAP diet     Discharge Medications   Current Discharge Medication List      CONTINUE these medications which have NOT CHANGED    Details   etonogestrel-ethinyl  estradiol (NUVARING) 0.12-0.015 MG/24HR vaginal ring Place 1 each vaginally every 28 days      LANsoprazole (PREVACID) 30 MG DR capsule Take 30 mg by mouth every morning (before breakfast)      Loperamide-Simethicone 2-125 MG TABS Take 1 tablet by mouth 2 times daily as needed           Allergies   No Known Allergies     Data   Results for orders placed or performed during the hospital encounter of 10/31/19   CBC with platelets differential     Status: None   Result Value Ref Range    WBC 6.4 4.0 - 11.0 10e9/L    RBC Count 5.15 3.8 - 5.2 10e12/L    Hemoglobin 14.1 11.7 - 15.7 g/dL    Hematocrit 43.2 35.0 - 47.0 %    MCV 84 78 - 100 fl    MCH 27.4 26.5 - 33.0 pg    MCHC 32.6 31.5 - 36.5 g/dL    RDW 11.9 10.0 - 15.0 %    Platelet Count 368 150 - 450 10e9/L    Diff Method Automated Method     % Neutrophils 51.7 %    % Lymphocytes 33.4 %    % Monocytes 12.7 %    % Eosinophils 1.4 %    % Basophils 0.6 %    % Immature Granulocytes 0.2 %    Nucleated RBCs 0 0 /100    Absolute Neutrophil 3.3 1.6 - 8.3 10e9/L    Absolute Lymphocytes 2.2 0.8 - 5.3 10e9/L    Absolute Monocytes 0.8 0.0 - 1.3 10e9/L    Absolute Eosinophils 0.1 0.0 - 0.7 10e9/L    Absolute Basophils 0.0 0.0 - 0.2 10e9/L    Abs Immature Granulocytes 0.0 0 - 0.4 10e9/L    Absolute Nucleated RBC 0.0    Comprehensive metabolic panel     Status: Abnormal   Result Value Ref Range    Sodium 132 (L) 133 - 144 mmol/L    Potassium 3.5 3.4 - 5.3 mmol/L    Chloride 98 94 - 109 mmol/L    Carbon Dioxide 26 20 - 32 mmol/L    Anion Gap 8 3 - 14 mmol/L    Glucose 93 70 - 99 mg/dL    Urea Nitrogen 8 7 - 30 mg/dL    Creatinine 0.99 0.52 - 1.04 mg/dL    GFR Estimate 72 >60 mL/min/[1.73_m2]    GFR Estimate If Black 83 >60 mL/min/[1.73_m2]    Calcium 8.7 8.5 - 10.1 mg/dL    Bilirubin Total 0.4 0.2 - 1.3 mg/dL    Albumin 3.0 (L) 3.4 - 5.0 g/dL    Protein Total 7.4 6.8 - 8.8 g/dL    Alkaline Phosphatase 92 40 - 150 U/L    ALT 10 0 - 50 U/L    AST 25 0 - 45 U/L   Stool: occult blood      Status: Abnormal   Result Value Ref Range    Occult Blood Positive (A) NEG^Negative   Glucose by meter     Status: None   Result Value Ref Range    Glucose 93 70 - 99 mg/dL   CBC with platelets     Status: Abnormal   Result Value Ref Range    WBC 6.1 4.0 - 11.0 10e9/L    RBC Count 4.01 3.8 - 5.2 10e12/L    Hemoglobin 11.0 (L) 11.7 - 15.7 g/dL    Hematocrit 34.3 (L) 35.0 - 47.0 %    MCV 86 78 - 100 fl    MCH 27.4 26.5 - 33.0 pg    MCHC 32.1 31.5 - 36.5 g/dL    RDW 12.1 10.0 - 15.0 %    Platelet Count 305 150 - 450 10e9/L   Basic metabolic panel     Status: Abnormal   Result Value Ref Range    Sodium 139 133 - 144 mmol/L    Potassium 3.5 3.4 - 5.3 mmol/L    Chloride 107 94 - 109 mmol/L    Carbon Dioxide 27 20 - 32 mmol/L    Anion Gap 5 3 - 14 mmol/L    Glucose 94 70 - 99 mg/dL    Urea Nitrogen 5 (L) 7 - 30 mg/dL    Creatinine 0.84 0.52 - 1.04 mg/dL    GFR Estimate 87 >60 mL/min/[1.73_m2]    GFR Estimate If Black >90 >60 mL/min/[1.73_m2]    Calcium 7.9 (L) 8.5 - 10.1 mg/dL   Enteric Bacteria and Virus Panel by [employer] Stool     Status: None   Result Value Ref Range    Campylobacter group by [employer] Not Detected NDET^Not Detected    Salmonella species by [employer] Not Detected NDET^Not Detected    Shigella species by [employer] Not Detected NDET^Not Detected    Vibrio group by [employer] Not Detected NDET^Not Detected    Rotavirus A by [employer] Not Detected NDET^Not Detected    Shiga toxin 1 gene by [employer] Not Detected NDET^Not Detected    Shiga toxin 2 gene by [employer] Not Detected NDET^Not Detected    Norovirus I and II by [employer] Not Detected NDET^Not Detected    Yersinia enterocolitica by [employer] Not Detected NDET^Not Detected    Enteric pathogen comment       Testing performed by multiplexed, qualitative PCR using the [employer] Enteric   Pathogens Nucleic Acid Test. Results should not be used as the sole basis for diagnosis,   treatment, or other patient management decisions.     Clostridium difficile toxin B PCR     Status: None   Result Value Ref  Range    Specimen Description Feces     C Diff Toxin B PCR Negative NEG^Negative     Sharon GAITANC

## 2019-11-01 NOTE — PROGRESS NOTES
PRIMARY DIAGNOSIS: Diarrhea and Generalized weakness      OUTPATIENT/OBSERVATION GOALS TO BE MET BEFORE DISCHARGE  1. Orthostatic performed: No     2. Tolerating PO fluid and/or antibiotics (if applicable): Yes      3. Nausea/Vomiting/Diarrhea symptoms improved: Yes no loose stool since early this morning.      4. Pain status: Pain free.     5. Return to near baseline physical activity: Yes     Discharge Planner Nurse   Safe discharge environment identified: Yes  Barriers to discharge: Yes       Entered by: Valeria Matt 11/01/2019 8435  Please review provider order for any additional goals.   Nurse to notify provider when observation goals have been met and patient is ready for discharge.     Patient is alert and oriented x4. VS WNL and documented on the FS. Lung sounds clear in all lobes and patient is on RA. Denies SOB. Active bowel sounds in all 4 quadrants. Patient stated she had 3 yellow liquid stools and 1 bloody liquid stool overnight and into early  morning. Denies any pain, urgency, and frequency when voiding. Patient denies pain. IV fluids infusing at 100 ml/hr. Gluten free diet and tolerated breakfast. (pateint ate 75%) Independent in room. Patient states nausea comes and goes. Patient states she is feeling much better.   /64 (BP Location: Right arm)   Pulse 89   Temp 98.7  F (37.1  C) (Oral)   Resp 16   Wt 71.8 kg (158 lb 4.8 oz)   LMP 10/14/2019   SpO2 96%   BMI 24.79 kg/m

## 2019-11-01 NOTE — PLAN OF CARE
Patient's After Visit Summary was reviewed with patient and/or spouse.   Patient verbalized understanding of After Visit Summary, recommended follow up and was given an opportunity to ask questions.   Discharge medications sent home with patient/family: no new medications    Discharged with spouse with all belongings. Patient medically cleared to discharge. Patient able to tolerate diet and no new liquid stools since early this morning.  will transport home.   /64 (BP Location: Right arm)   Pulse 89   Temp 98.7  F (37.1  C) (Oral)   Resp 16   Wt 71.8 kg (158 lb 4.8 oz)   LMP 10/14/2019   SpO2 96%   BMI 24.79 kg/m    OBSERVATION patient END time: 1500

## 2019-11-01 NOTE — PLAN OF CARE
PRIMARY DIAGNOSIS: Bloody Diarrhea     OUTPATIENT/OBSERVATION GOALS TO BE MET BEFORE DISCHARGE  1. Orthostatic performed: N/A    2. Tolerating PO fluid and/or antibiotics (if applicable): Yes    3. Nausea/Vomiting/Diarrhea symptoms improved: Yes    4. Pain status: Pain free.    5. Return to near baseline physical activity: Yes    Discharge Planner Nurse   Safe discharge environment identified: Yes  Barriers to discharge: Yes        Please review provider order for any additional goals.   Nurse to notify provider when observation goals have been met and patient is ready for discharge.

## 2019-11-01 NOTE — PROGRESS NOTES
ROOM # 231    Living Situation (if not independent, order SW consult):  Facility name: Home   : Manuel Burgos     Activity level at baseline: Independent   Activity level on admit: Independent       Patient registered to observation; given Patient Bill of Rights; given the opportunity to ask questions about observation status and their plan of care.  Patient has been oriented to the observation room, bathroom and call light is in place.    Discussed discharge goals and expectations with patient/family.

## 2019-11-01 NOTE — PLAN OF CARE
PRIMARY DIAGNOSIS: Bloody Diarrhea     OUTPATIENT/OBSERVATION GOALS TO BE MET BEFORE DISCHARGE  1. Orthostatic performed: N/A    2. Tolerating PO fluid and/or antibiotics (if applicable): Yes    3. Nausea/Vomiting/Diarrhea symptoms improved: Yes    4. Pain status: Pain free.    5. Return to near baseline physical activity: Yes    Discharge Planner Nurse   Safe discharge environment identified: Yes  Barriers to discharge: Yes    Pt A&Ox4. LS clear. Denies nausea. BS audible and active. Gluten free diet. Only 1 small bloody stool overnight. Pt states that abd discomfort has decreased. Voiding spontaneously. Skin intact. IVF running LR at 100 mL/hr. Independent in the room. Will continue supportive cares and continue to monitor.          Please review provider order for any additional goals.   Nurse to notify provider when observation goals have been met and patient is ready for discharge.

## 2019-11-01 NOTE — ED NOTES
Pt has no last complaints or requests while in ED, has been able to tolerate pedialyte, roughly 6oz, and a small amount of rice cereal, pt is reporting improvement in nausea and cramping at this time. Pleasant.

## 2020-03-11 ENCOUNTER — HEALTH MAINTENANCE LETTER (OUTPATIENT)
Age: 40
End: 2020-03-11

## 2021-01-03 ENCOUNTER — HEALTH MAINTENANCE LETTER (OUTPATIENT)
Age: 41
End: 2021-01-03

## 2021-04-25 ENCOUNTER — HEALTH MAINTENANCE LETTER (OUTPATIENT)
Age: 41
End: 2021-04-25

## 2021-10-10 ENCOUNTER — HEALTH MAINTENANCE LETTER (OUTPATIENT)
Age: 41
End: 2021-10-10

## 2022-05-21 ENCOUNTER — HEALTH MAINTENANCE LETTER (OUTPATIENT)
Age: 42
End: 2022-05-21

## 2022-09-18 ENCOUNTER — HEALTH MAINTENANCE LETTER (OUTPATIENT)
Age: 42
End: 2022-09-18

## 2022-10-18 ENCOUNTER — HOSPITAL ENCOUNTER (OUTPATIENT)
Dept: MAMMOGRAPHY | Facility: CLINIC | Age: 42
Discharge: HOME | End: 2022-10-18
Attending: OBSTETRICS & GYNECOLOGY | Admitting: OBSTETRICS & GYNECOLOGY
Payer: COMMERCIAL

## 2022-10-18 DIAGNOSIS — Z12.31 VISIT FOR SCREENING MAMMOGRAM: ICD-10-CM

## 2022-10-18 PROCEDURE — 77067 SCR MAMMO BI INCL CAD: CPT

## 2022-12-16 ENCOUNTER — LAB REQUISITION (OUTPATIENT)
Dept: LAB | Facility: CLINIC | Age: 42
End: 2022-12-16

## 2022-12-16 DIAGNOSIS — Z01.419 ENCOUNTER FOR GYNECOLOGICAL EXAMINATION (GENERAL) (ROUTINE) WITHOUT ABNORMAL FINDINGS: ICD-10-CM

## 2022-12-16 PROCEDURE — 87624 HPV HI-RISK TYP POOLED RSLT: CPT | Performed by: OBSTETRICS & GYNECOLOGY

## 2022-12-16 PROCEDURE — G0145 SCR C/V CYTO,THINLAYER,RESCR: HCPCS | Performed by: OBSTETRICS & GYNECOLOGY

## 2022-12-20 LAB
BKR LAB AP GYN ADEQUACY: NORMAL
BKR LAB AP GYN INTERPRETATION: NORMAL
BKR LAB AP HPV REFLEX: NORMAL
BKR LAB AP LMP: NORMAL
BKR LAB AP PREVIOUS ABNL DX: NORMAL
BKR LAB AP PREVIOUS ABNORMAL: NORMAL
PATH REPORT.COMMENTS IMP SPEC: NORMAL
PATH REPORT.COMMENTS IMP SPEC: NORMAL
PATH REPORT.RELEVANT HX SPEC: NORMAL

## 2022-12-22 LAB
HUMAN PAPILLOMA VIRUS 16 DNA: NEGATIVE
HUMAN PAPILLOMA VIRUS 18 DNA: NEGATIVE
HUMAN PAPILLOMA VIRUS FINAL DIAGNOSIS: NORMAL
HUMAN PAPILLOMA VIRUS OTHER HR: NEGATIVE

## 2023-01-29 ENCOUNTER — HEALTH MAINTENANCE LETTER (OUTPATIENT)
Age: 43
End: 2023-01-29

## 2023-04-28 ENCOUNTER — LAB REQUISITION (OUTPATIENT)
Dept: LAB | Facility: CLINIC | Age: 43
End: 2023-04-28

## 2023-04-28 DIAGNOSIS — R61 GENERALIZED HYPERHIDROSIS: ICD-10-CM

## 2023-04-28 LAB
BASOPHILS # BLD AUTO: 0.1 10E3/UL (ref 0–0.2)
BASOPHILS NFR BLD AUTO: 1 %
EOSINOPHIL # BLD AUTO: 0.1 10E3/UL (ref 0–0.7)
EOSINOPHIL NFR BLD AUTO: 1 %
ERYTHROCYTE [DISTWIDTH] IN BLOOD BY AUTOMATED COUNT: 13.9 % (ref 10–15)
HCT VFR BLD AUTO: 41.1 % (ref 35–47)
HGB BLD-MCNC: 12.8 G/DL (ref 11.7–15.7)
IMM GRANULOCYTES # BLD: 0 10E3/UL
IMM GRANULOCYTES NFR BLD: 0 %
LYMPHOCYTES # BLD AUTO: 3.4 10E3/UL (ref 0.8–5.3)
LYMPHOCYTES NFR BLD AUTO: 34 %
MCH RBC QN AUTO: 26.9 PG (ref 26.5–33)
MCHC RBC AUTO-ENTMCNC: 31.1 G/DL (ref 31.5–36.5)
MCV RBC AUTO: 86 FL (ref 78–100)
MONOCYTES # BLD AUTO: 0.6 10E3/UL (ref 0–1.3)
MONOCYTES NFR BLD AUTO: 6 %
NEUTROPHILS # BLD AUTO: 5.8 10E3/UL (ref 1.6–8.3)
NEUTROPHILS NFR BLD AUTO: 58 %
NRBC # BLD AUTO: 0 10E3/UL
NRBC BLD AUTO-RTO: 0 /100
PLATELET # BLD AUTO: 400 10E3/UL (ref 150–450)
RBC # BLD AUTO: 4.76 10E6/UL (ref 3.8–5.2)
WBC # BLD AUTO: 10 10E3/UL (ref 4–11)

## 2023-04-28 PROCEDURE — 80053 COMPREHEN METABOLIC PANEL: CPT | Performed by: OBSTETRICS & GYNECOLOGY

## 2023-04-28 PROCEDURE — 85025 COMPLETE CBC W/AUTO DIFF WBC: CPT | Performed by: OBSTETRICS & GYNECOLOGY

## 2023-04-29 LAB
ALBUMIN SERPL BCG-MCNC: 4.2 G/DL (ref 3.5–5.2)
ALP SERPL-CCNC: 74 U/L (ref 35–104)
ALT SERPL W P-5'-P-CCNC: 8 U/L (ref 10–35)
ANION GAP SERPL CALCULATED.3IONS-SCNC: 14 MMOL/L (ref 7–15)
AST SERPL W P-5'-P-CCNC: 28 U/L (ref 10–35)
BILIRUB SERPL-MCNC: 0.2 MG/DL
BUN SERPL-MCNC: 10.5 MG/DL (ref 6–20)
CALCIUM SERPL-MCNC: 9.5 MG/DL (ref 8.6–10)
CHLORIDE SERPL-SCNC: 104 MMOL/L (ref 98–107)
CREAT SERPL-MCNC: 0.93 MG/DL (ref 0.51–0.95)
DEPRECATED HCO3 PLAS-SCNC: 23 MMOL/L (ref 22–29)
GFR SERPL CREATININE-BSD FRML MDRD: 78 ML/MIN/1.73M2
GLUCOSE SERPL-MCNC: 93 MG/DL (ref 70–99)
POTASSIUM SERPL-SCNC: 4.1 MMOL/L (ref 3.4–5.3)
PROT SERPL-MCNC: 7.3 G/DL (ref 6.4–8.3)
SODIUM SERPL-SCNC: 141 MMOL/L (ref 136–145)

## 2023-06-13 ENCOUNTER — LAB REQUISITION (OUTPATIENT)
Dept: LAB | Facility: CLINIC | Age: 43
End: 2023-06-13
Payer: COMMERCIAL

## 2023-06-13 DIAGNOSIS — N95.1 MENOPAUSAL AND FEMALE CLIMACTERIC STATES: ICD-10-CM

## 2023-06-13 PROCEDURE — 83001 ASSAY OF GONADOTROPIN (FSH): CPT | Mod: ORL | Performed by: OBSTETRICS & GYNECOLOGY

## 2023-06-14 LAB — FSH SERPL IRP2-ACNC: 7.2 MIU/ML

## 2023-09-10 ENCOUNTER — APPOINTMENT (OUTPATIENT)
Dept: MRI IMAGING | Facility: CLINIC | Age: 43
DRG: 060 | End: 2023-09-10
Attending: EMERGENCY MEDICINE
Payer: COMMERCIAL

## 2023-09-10 ENCOUNTER — HOSPITAL ENCOUNTER (INPATIENT)
Facility: CLINIC | Age: 43
LOS: 5 days | Discharge: HOME | DRG: 060 | End: 2023-09-15
Attending: EMERGENCY MEDICINE | Admitting: INTERNAL MEDICINE
Payer: COMMERCIAL

## 2023-09-10 DIAGNOSIS — G35 MULTIPLE SCLEROSIS (H): Primary | ICD-10-CM

## 2023-09-10 DIAGNOSIS — G35 MULTIPLE SCLEROSIS EXACERBATION (H): ICD-10-CM

## 2023-09-10 LAB
ANION GAP SERPL CALCULATED.3IONS-SCNC: 8 MMOL/L (ref 7–15)
BASOPHILS # BLD AUTO: 0.1 10E3/UL (ref 0–0.2)
BASOPHILS NFR BLD AUTO: 1 %
BUN SERPL-MCNC: 9.6 MG/DL (ref 6–20)
CALCIUM SERPL-MCNC: 9.2 MG/DL (ref 8.6–10)
CHLORIDE SERPL-SCNC: 104 MMOL/L (ref 98–107)
CREAT SERPL-MCNC: 0.82 MG/DL (ref 0.51–0.95)
DEPRECATED HCO3 PLAS-SCNC: 25 MMOL/L (ref 22–29)
EGFRCR SERPLBLD CKD-EPI 2021: >90 ML/MIN/1.73M2
EOSINOPHIL # BLD AUTO: 0.1 10E3/UL (ref 0–0.7)
EOSINOPHIL NFR BLD AUTO: 1 %
ERYTHROCYTE [DISTWIDTH] IN BLOOD BY AUTOMATED COUNT: 14.1 % (ref 10–15)
GLUCOSE SERPL-MCNC: 116 MG/DL (ref 70–99)
HCG SERPL QL: NEGATIVE
HCT VFR BLD AUTO: 42.9 % (ref 35–47)
HGB BLD-MCNC: 13.4 G/DL (ref 11.7–15.7)
HOLD SPECIMEN: NORMAL
IMM GRANULOCYTES # BLD: 0 10E3/UL
IMM GRANULOCYTES NFR BLD: 0 %
LYMPHOCYTES # BLD AUTO: 3.1 10E3/UL (ref 0.8–5.3)
LYMPHOCYTES NFR BLD AUTO: 28 %
MCH RBC QN AUTO: 25.8 PG (ref 26.5–33)
MCHC RBC AUTO-ENTMCNC: 31.2 G/DL (ref 31.5–36.5)
MCV RBC AUTO: 83 FL (ref 78–100)
MONOCYTES # BLD AUTO: 0.6 10E3/UL (ref 0–1.3)
MONOCYTES NFR BLD AUTO: 6 %
NEUTROPHILS # BLD AUTO: 7.2 10E3/UL (ref 1.6–8.3)
NEUTROPHILS NFR BLD AUTO: 64 %
NRBC # BLD AUTO: 0 10E3/UL
NRBC BLD AUTO-RTO: 0 /100
PLATELET # BLD AUTO: 396 10E3/UL (ref 150–450)
POTASSIUM SERPL-SCNC: 4 MMOL/L (ref 3.4–5.3)
RBC # BLD AUTO: 5.2 10E6/UL (ref 3.8–5.2)
SODIUM SERPL-SCNC: 137 MMOL/L (ref 136–145)
WBC # BLD AUTO: 11.2 10E3/UL (ref 4–11)

## 2023-09-10 PROCEDURE — 255N000002 HC RX 255 OP 636: Performed by: EMERGENCY MEDICINE

## 2023-09-10 PROCEDURE — 96374 THER/PROPH/DIAG INJ IV PUSH: CPT

## 2023-09-10 PROCEDURE — 99222 1ST HOSP IP/OBS MODERATE 55: CPT | Performed by: INTERNAL MEDICINE

## 2023-09-10 PROCEDURE — 120N000001 HC R&B MED SURG/OB

## 2023-09-10 PROCEDURE — 80048 BASIC METABOLIC PNL TOTAL CA: CPT | Performed by: STUDENT IN AN ORGANIZED HEALTH CARE EDUCATION/TRAINING PROGRAM

## 2023-09-10 PROCEDURE — 80048 BASIC METABOLIC PNL TOTAL CA: CPT | Performed by: EMERGENCY MEDICINE

## 2023-09-10 PROCEDURE — 85025 COMPLETE CBC W/AUTO DIFF WBC: CPT | Performed by: STUDENT IN AN ORGANIZED HEALTH CARE EDUCATION/TRAINING PROGRAM

## 2023-09-10 PROCEDURE — 99285 EMERGENCY DEPT VISIT HI MDM: CPT | Mod: 25

## 2023-09-10 PROCEDURE — 250N000011 HC RX IP 250 OP 636: Performed by: EMERGENCY MEDICINE

## 2023-09-10 PROCEDURE — 84703 CHORIONIC GONADOTROPIN ASSAY: CPT | Performed by: EMERGENCY MEDICINE

## 2023-09-10 PROCEDURE — 70549 MR ANGIOGRAPH NECK W/O&W/DYE: CPT

## 2023-09-10 PROCEDURE — A9585 GADOBUTROL INJECTION: HCPCS | Performed by: EMERGENCY MEDICINE

## 2023-09-10 PROCEDURE — 258N000003 HC RX IP 258 OP 636: Performed by: EMERGENCY MEDICINE

## 2023-09-10 PROCEDURE — 70553 MRI BRAIN STEM W/O & W/DYE: CPT

## 2023-09-10 PROCEDURE — 70544 MR ANGIOGRAPHY HEAD W/O DYE: CPT

## 2023-09-10 PROCEDURE — 84703 CHORIONIC GONADOTROPIN ASSAY: CPT | Performed by: STUDENT IN AN ORGANIZED HEALTH CARE EDUCATION/TRAINING PROGRAM

## 2023-09-10 PROCEDURE — 250N000013 HC RX MED GY IP 250 OP 250 PS 637: Performed by: EMERGENCY MEDICINE

## 2023-09-10 PROCEDURE — 85025 COMPLETE CBC W/AUTO DIFF WBC: CPT | Performed by: EMERGENCY MEDICINE

## 2023-09-10 PROCEDURE — B33RZZZ MAGNETIC RESONANCE IMAGING (MRI) OF INTRACRANIAL ARTERIES: ICD-10-PCS | Performed by: RADIOLOGY

## 2023-09-10 PROCEDURE — 36415 COLL VENOUS BLD VENIPUNCTURE: CPT | Performed by: STUDENT IN AN ORGANIZED HEALTH CARE EDUCATION/TRAINING PROGRAM

## 2023-09-10 PROCEDURE — 93005 ELECTROCARDIOGRAM TRACING: CPT

## 2023-09-10 RX ORDER — PROCHLORPERAZINE MALEATE 5 MG
10 TABLET ORAL EVERY 6 HOURS PRN
Status: DISCONTINUED | OUTPATIENT
Start: 2023-09-10 | End: 2023-09-15 | Stop reason: HOSPADM

## 2023-09-10 RX ORDER — ACETAMINOPHEN 325 MG/1
650 TABLET ORAL ONCE
Status: COMPLETED | OUTPATIENT
Start: 2023-09-10 | End: 2023-09-10

## 2023-09-10 RX ORDER — ONDANSETRON 4 MG/1
4 TABLET, ORALLY DISINTEGRATING ORAL EVERY 6 HOURS PRN
Status: DISCONTINUED | OUTPATIENT
Start: 2023-09-10 | End: 2023-09-15 | Stop reason: HOSPADM

## 2023-09-10 RX ORDER — NALOXONE HYDROCHLORIDE 0.4 MG/ML
0.4 INJECTION, SOLUTION INTRAMUSCULAR; INTRAVENOUS; SUBCUTANEOUS
Status: DISCONTINUED | OUTPATIENT
Start: 2023-09-10 | End: 2023-09-15 | Stop reason: HOSPADM

## 2023-09-10 RX ORDER — ONDANSETRON 2 MG/ML
4 INJECTION INTRAMUSCULAR; INTRAVENOUS EVERY 6 HOURS PRN
Status: DISCONTINUED | OUTPATIENT
Start: 2023-09-10 | End: 2023-09-15 | Stop reason: HOSPADM

## 2023-09-10 RX ORDER — MECLIZINE HYDROCHLORIDE 25 MG/1
25 TABLET ORAL ONCE
Status: COMPLETED | OUTPATIENT
Start: 2023-09-10 | End: 2023-09-10

## 2023-09-10 RX ORDER — NALOXONE HYDROCHLORIDE 0.4 MG/ML
0.2 INJECTION, SOLUTION INTRAMUSCULAR; INTRAVENOUS; SUBCUTANEOUS
Status: DISCONTINUED | OUTPATIENT
Start: 2023-09-10 | End: 2023-09-15 | Stop reason: HOSPADM

## 2023-09-10 RX ORDER — GADOBUTROL 604.72 MG/ML
10 INJECTION INTRAVENOUS ONCE
Status: COMPLETED | OUTPATIENT
Start: 2023-09-10 | End: 2023-09-10

## 2023-09-10 RX ORDER — HYDROMORPHONE HCL IN WATER/PF 6 MG/30 ML
0.2 PATIENT CONTROLLED ANALGESIA SYRINGE INTRAVENOUS
Status: DISCONTINUED | OUTPATIENT
Start: 2023-09-10 | End: 2023-09-15 | Stop reason: HOSPADM

## 2023-09-10 RX ORDER — FLUTICASONE PROPIONATE 50 MCG
1-2 SPRAY, SUSPENSION (ML) NASAL DAILY PRN
COMMUNITY
Start: 2023-08-30

## 2023-09-10 RX ORDER — PROCHLORPERAZINE 25 MG
25 SUPPOSITORY, RECTAL RECTAL EVERY 12 HOURS PRN
Status: DISCONTINUED | OUTPATIENT
Start: 2023-09-10 | End: 2023-09-15 | Stop reason: HOSPADM

## 2023-09-10 RX ORDER — PANTOPRAZOLE SODIUM 40 MG/1
40 TABLET, DELAYED RELEASE ORAL
Status: DISCONTINUED | OUTPATIENT
Start: 2023-09-11 | End: 2023-09-11

## 2023-09-10 RX ORDER — LIDOCAINE 40 MG/G
CREAM TOPICAL
Status: DISCONTINUED | OUTPATIENT
Start: 2023-09-10 | End: 2023-09-15 | Stop reason: HOSPADM

## 2023-09-10 RX ORDER — LORAZEPAM 2 MG/ML
0.5 INJECTION INTRAMUSCULAR ONCE
Status: COMPLETED | OUTPATIENT
Start: 2023-09-10 | End: 2023-09-10

## 2023-09-10 RX ADMIN — GADOBUTROL 10 ML: 604.72 INJECTION INTRAVENOUS at 15:19

## 2023-09-10 RX ADMIN — LORAZEPAM 0.5 MG: 2 INJECTION INTRAMUSCULAR; INTRAVENOUS at 14:58

## 2023-09-10 RX ADMIN — MECLIZINE HYDROCHLORIDE 25 MG: 25 TABLET ORAL at 14:35

## 2023-09-10 RX ADMIN — ACETAMINOPHEN 650 MG: 325 TABLET, FILM COATED ORAL at 17:57

## 2023-09-10 RX ADMIN — SODIUM CHLORIDE 1000 MG: 9 INJECTION, SOLUTION INTRAVENOUS at 18:39

## 2023-09-10 ASSESSMENT — ACTIVITIES OF DAILY LIVING (ADL)
ADLS_ACUITY_SCORE: 35

## 2023-09-10 NOTE — ED PROVIDER NOTES
"    History     Chief Complaint:  Numbness and Dizziness       HPI   Bernardo Burgos is a 42 year old female presents with dizziness.  She has a notebook to help delineate the history.    8/28/2023 patient developed dizziness and headache was in the posterior aspect of her head and went up the back of her head and was a burning type pain.  This subsided and was able [place] go [place] the state [place].    829 dizziness and headache returned and was worse but not the worst headache of her life.    8/31/2023 she developed blurred vision and went in and had a CT labs and was told she had fluid in her ear and was started on prednisone 20 mg twice a day for 5 days.    After this she felt like she was starting to get worse.    On 9/1/2023 she developed double vision and describes it in a diagonal offset.  She then developed numbness into her left lip tip of her tongue left lower jaw and describes or points to distribution that would be consistent with trigeminal neuralgia cranial nerve V branch 3 location.    On 9/5/2023 she saw ENT and notes that her hearing, inner ear pressures and ear exam was negative for infection or fluid and thought possibly this might be related to migraines.  She notes that he took her had 3 different movements describing somewhat of an Epley maneuver as well as hints exam and noted some abnormalities to the left eye.  He has suggested following up with neurology which she could not get in until October.    Patient also saw optometry or ophthalmology on September 5, 2023 and her eyes were \"okay \".    She notes both doctors thought that possibly the prednisone made her symptoms worse and then it was a migraine and might take some time to go away.    Patient notes that she is not having any ongoing headache or neck pain but is having the paresthesias to the left side of her face as well as unsteadiness and notes sense of being off balance.  She denies any chiropractic work other than some low back issues " but no cervical neck manipulations.      Independent Historian:    Patient    Review of External Notes:  Chart review and imaging noting a normal CT    Medications:    fluticasone (FLONASE) 50 MCG/ACT nasal spray  LANsoprazole (PREVACID) 30 MG DR capsule        Past Medical History:    No past medical history on file.    Past Surgical History:    No past surgical history on file.       Physical Exam   Patient Vitals for the past 24 hrs:   BP Temp Temp src Pulse Resp SpO2 Weight   09/10/23 1830 (!) 157/103 -- -- 109 -- -- --   09/10/23 1821 -- 98.2  F (36.8  C) Oral -- 16 98 % --   09/10/23 1730 (!) 159/82 -- -- 92 -- 97 % --   09/10/23 1725 (!) 149/86 -- -- 104 -- 98 % --   09/10/23 1630 135/81 -- -- 100 18 97 % --   09/10/23 1615 133/88 -- -- 90 -- 98 % --   09/10/23 1347 136/76 98  F (36.7  C) Oral 112 16 98 % --   09/10/23 1132 (!) 166/95 98.4  F (36.9  C) Oral 103 16 98 % 94.8 kg (208 lb 15.9 oz)        Physical Exam  GENERAL: well developed, pleasant  HEAD: atraumatic  EYES: pupils reactive, extraocular muscles intact, conjunctivae normal  ENT:  mucus membranes moist  NECK:  trachea midline, normal range of motion  RESPIRATORY: No tachypnea  CVS: Well-perfused skin  ABDOMEN: No distention  MUSCULOSKELETAL: no deformities  SKIN: warm and dry, no acute rashes or ulceration  NEURO: GCS 15, cranial nerves intact, alert and oriented x3, paresthesias to cranial nerve V branch 3 distribution, slight dysmetria finger-nose on the left, able to ambulate with slightly caution and Romberg is negative although she does sway and struggling to do this, Epley maneuver does reproduce symptoms to the right with a slow nystagmus.  PSYCH:  Mood/affect normal      Emergency Department Course       Imaging:  MRA Angiogram Neck w/o & w Contrast   Final Result   IMPRESSION:   HEAD MRI:    1.  T2 shine through in the left middle cerebral peduncle and questionable enhancement is concerning for underlying demyelinating disease with  active inflammation.   2.  Minimal scattered white matter T2/FLAIR signal abnormality elsewhere likely represents additional demyelinating plaques. These show no abnormal enhancement.      HEAD MRA:    1.  No aneurysm, high flow AVM or significant stenosis identified.      NECK MRA:   1.  No hemodynamically significant stenosis in the vessels of the neck.   2.  Irregularity vertebral arteries on postcontrast imaging only is favored to be artifactual. Other etiologies such as fibromuscular dysplasia are felt to be less unlikely. A CT angiogram could confirm if clinically indicated.      MR Brain w/o & w Contrast   Final Result   IMPRESSION:   HEAD MRI:    1.  T2 shine through in the left middle cerebral peduncle and questionable enhancement is concerning for underlying demyelinating disease with active inflammation.   2.  Minimal scattered white matter T2/FLAIR signal abnormality elsewhere likely represents additional demyelinating plaques. These show no abnormal enhancement.      HEAD MRA:    1.  No aneurysm, high flow AVM or significant stenosis identified.      NECK MRA:   1.  No hemodynamically significant stenosis in the vessels of the neck.   2.  Irregularity vertebral arteries on postcontrast imaging only is favored to be artifactual. Other etiologies such as fibromuscular dysplasia are felt to be less unlikely. A CT angiogram could confirm if clinically indicated.      MRA Angiogram Head w/o Contrast   Final Result   IMPRESSION:   HEAD MRI:    1.  T2 shine through in the left middle cerebral peduncle and questionable enhancement is concerning for underlying demyelinating disease with active inflammation.   2.  Minimal scattered white matter T2/FLAIR signal abnormality elsewhere likely represents additional demyelinating plaques. These show no abnormal enhancement.      HEAD MRA:    1.  No aneurysm, high flow AVM or significant stenosis identified.      NECK MRA:   1.  No hemodynamically significant stenosis  in the vessels of the neck.   2.  Irregularity vertebral arteries on postcontrast imaging only is favored to be artifactual. Other etiologies such as fibromuscular dysplasia are felt to be less unlikely. A CT angiogram could confirm if clinically indicated.        Report per radiology    Laboratory:  Labs Ordered and Resulted from Time of ED Arrival to Time of ED Departure   BASIC METABOLIC PANEL - Abnormal       Result Value    Sodium 137      Potassium 4.0      Chloride 104      Carbon Dioxide (CO2) 25      Anion Gap 8      Urea Nitrogen 9.6      Creatinine 0.82      Calcium 9.2      Glucose 116 (*)     GFR Estimate >90     CBC WITH PLATELETS AND DIFFERENTIAL - Abnormal    WBC Count 11.2 (*)     RBC Count 5.20      Hemoglobin 13.4      Hematocrit 42.9      MCV 83      MCH 25.8 (*)     MCHC 31.2 (*)     RDW 14.1      Platelet Count 396      % Neutrophils 64      % Lymphocytes 28      % Monocytes 6      % Eosinophils 1      % Basophils 1      % Immature Granulocytes 0      NRBCs per 100 WBC 0      Absolute Neutrophils 7.2      Absolute Lymphocytes 3.1      Absolute Monocytes 0.6      Absolute Eosinophils 0.1      Absolute Basophils 0.1      Absolute Immature Granulocytes 0.0      Absolute NRBCs 0.0     HCG QUALITATIVE PREGNANCY - Normal    hCG Serum Qualitative Negative          Procedures       Emergency Department Course & Assessments:             Interventions:  Medications   meclizine (ANTIVERT) tablet 25 mg (25 mg Oral $Given 9/10/23 1435)   LORazepam (ATIVAN) injection 0.5 mg (0.5 mg Intravenous $Given 9/10/23 1458)   sodium chloride (PF) 0.9% PF flush 60 mL (100 mLs Intravenous $Given 9/10/23 1519)   gadobutrol (GADAVIST) injection 10 mL (10 mLs Intravenous $Given 9/10/23 1519)   methylPREDNISolone sodium succinate (solu-MEDROL) 1,000 mg in sodium chloride 0.9 % 291 mL intermittent infusion (1,000 mg Intravenous $New Bag 9/10/23 7464)   acetaminophen (TYLENOL) tablet 650 mg (650 mg Oral $Given 9/10/23 7010)         Assessments:      Independent Interpretation (X-rays, CTs, rhythm strip):  None    Consultations/Discussion of Management or Tests:  Neurology Dr. Sauer       Social Determinants of Health affecting care:  na     Disposition:  The patient was admitted to the hospital under the care of Dr. Jarvis.     Impression & Plan    CMS Diagnoses: None          Medical Decision Making:  Patient presents with new onset vertigo, episode of diplopia that is now subsided and persistent facial numbness in the V5 3 distribution.  She has mild dysmetria with finger-nose but able to ambulate in the hallway but notes its not her normal gait.      MRI MRA shows new onset MS.  Spoke with neurology who suggested high-dose steroids and admission for 5 days.  Went back to talk to the patient and she had already read her results through her portal and was a bit upset emotionally about the findings which is certainly understandable to be dealing with this new diagnosis.      Discussed with her the findings and the treatment plan.  She felt that her symptoms actually worsened with steroids when she was being treated for suspected inner ear issue but discussed with her that was the suggestion and whether her symptoms were related to the prednisone versus progression of the disease it is unclear.      She is here with her  and he is at the bedside with her.      Spoke with the hospitalist regarding admission.    Critical Care time:  was 0 minutes for this patient excluding procedures.    Diagnosis:    ICD-10-CM    1. Multiple sclerosis exacerbation (H)  G35            Discharge Medications:  New Prescriptions    No medications on file          Nicholas Marshall MD  9/10/2023   Nicholas Marshall MD Adams, Shaun L, MD  09/10/23 1925       Nicholas Marshall MD  09/10/23 1928

## 2023-09-10 NOTE — ED NOTES
[facility]  ED Nurse Handoff Report    ED Chief complaint: Numbness and Dizziness  . ED Diagnosis:   Final diagnoses:   Multiple sclerosis exacerbation (H)       Allergies: No Known Allergies    Code Status: Full Code    Activity level - Baseline/Home:  independent.  Activity Level - Current:   standby.   Lift room needed: No.   Bariatric: No   Needed: No   Isolation: No.   Infection: Not Applicable.     Respiratory status: Room air    Vital Signs (within 30 minutes):   Vitals:    09/10/23 1630 09/10/23 1725 09/10/23 1730 09/10/23 1821   BP: 135/81 (!) 149/86 (!) 159/82    BP Location: Right arm      Patient Position: Semi-Card's      Cuff Size: Adult Regular      Pulse: 100 104 92    Resp: 18   16   Temp:    98.2  F (36.8  C)   TempSrc:    Oral   SpO2: 97% 98% 97% 98%   Weight:           Cardiac Rhythm:  ,      Pain level:    Patient confused: No.   Patient Falls Risk: bed/chair alarm on.   Elimination Status: Has voided     Patient Report - Initial Complaint: Numbness, Dizziness.   Focused Assessment: Pt presents with left sided facial numbness, dizziness. Symptoms started 8/28. Was seen at  8/31 for headache, blurred vision and BL ear pressure. CT head reportedly negative at that time. Was given an RX: Prednisone. Was seen at ENT, who suggested it was migraine. Pt reports +nausea, denies vomiting. Pt reports dyspnea on exertion, denies chest pain. A&OX4.       Abnormal Results:   Labs Ordered and Resulted from Time of ED Arrival to Time of ED Departure   BASIC METABOLIC PANEL - Abnormal       Result Value    Sodium 137      Potassium 4.0      Chloride 104      Carbon Dioxide (CO2) 25      Anion Gap 8      Urea Nitrogen 9.6      Creatinine 0.82      Calcium 9.2      Glucose 116 (*)     GFR Estimate >90     CBC WITH PLATELETS AND DIFFERENTIAL - Abnormal    WBC Count 11.2 (*)     RBC Count 5.20      Hemoglobin 13.4      Hematocrit 42.9      MCV 83      MCH 25.8 (*)     MCHC 31.2  (*)     RDW 14.1      Platelet Count 396      % Neutrophils 64      % Lymphocytes 28      % Monocytes 6      % Eosinophils 1      % Basophils 1      % Immature Granulocytes 0      NRBCs per 100 WBC 0      Absolute Neutrophils 7.2      Absolute Lymphocytes 3.1      Absolute Monocytes 0.6      Absolute Eosinophils 0.1      Absolute Basophils 0.1      Absolute Immature Granulocytes 0.0      Absolute NRBCs 0.0     HCG QUALITATIVE PREGNANCY - Normal    hCG Serum Qualitative Negative          MRA Angiogram Neck w/o & w Contrast   Final Result   IMPRESSION:   HEAD MRI:    1.  T2 shine through in the left middle cerebral peduncle and questionable enhancement is concerning for underlying demyelinating disease with active inflammation.   2.  Minimal scattered white matter T2/FLAIR signal abnormality elsewhere likely represents additional demyelinating plaques. These show no abnormal enhancement.      HEAD MRA:    1.  No aneurysm, high flow AVM or significant stenosis identified.      NECK MRA:   1.  No hemodynamically significant stenosis in the vessels of the neck.   2.  Irregularity vertebral arteries on postcontrast imaging only is favored to be artifactual. Other etiologies such as fibromuscular dysplasia are felt to be less unlikely. A CT angiogram could confirm if clinically indicated.      MR Brain w/o & w Contrast   Final Result   IMPRESSION:   HEAD MRI:    1.  T2 shine through in the left middle cerebral peduncle and questionable enhancement is concerning for underlying demyelinating disease with active inflammation.   2.  Minimal scattered white matter T2/FLAIR signal abnormality elsewhere likely represents additional demyelinating plaques. These show no abnormal enhancement.      HEAD MRA:    1.  No aneurysm, high flow AVM or significant stenosis identified.      NECK MRA:   1.  No hemodynamically significant stenosis in the vessels of the neck.   2.  Irregularity vertebral arteries on postcontrast imaging only is  favored to be artifactual. Other etiologies such as fibromuscular dysplasia are felt to be less unlikely. A CT angiogram could confirm if clinically indicated.      MRA Angiogram Head w/o Contrast   Final Result   IMPRESSION:   HEAD MRI:    1.  T2 shine through in the left middle cerebral peduncle and questionable enhancement is concerning for underlying demyelinating disease with active inflammation.   2.  Minimal scattered white matter T2/FLAIR signal abnormality elsewhere likely represents additional demyelinating plaques. These show no abnormal enhancement.      HEAD MRA:    1.  No aneurysm, high flow AVM or significant stenosis identified.      NECK MRA:   1.  No hemodynamically significant stenosis in the vessels of the neck.   2.  Irregularity vertebral arteries on postcontrast imaging only is favored to be artifactual. Other etiologies such as fibromuscular dysplasia are felt to be less unlikely. A CT angiogram could confirm if clinically indicated.          Treatments provided: See MAR  Family Comments:  at the bedside  OBS brochure/video discussed/provided to patient:  Yes  ED Medications:   Medications   methylPREDNISolone sodium succinate (solu-MEDROL) 1,000 mg in sodium chloride 0.9 % 291 mL intermittent infusion (has no administration in time range)   meclizine (ANTIVERT) tablet 25 mg (25 mg Oral $Given 9/10/23 1435)   LORazepam (ATIVAN) injection 0.5 mg (0.5 mg Intravenous $Given 9/10/23 1458)   sodium chloride (PF) 0.9% PF flush 60 mL (100 mLs Intravenous $Given 9/10/23 1519)   gadobutrol (GADAVIST) injection 10 mL (10 mLs Intravenous $Given 9/10/23 1519)   acetaminophen (TYLENOL) tablet 650 mg (650 mg Oral $Given 9/10/23 1757)       Drips infusing:  No  For the majority of the shift this patient was Green.   Interventions performed were none.    Sepsis treatment initiated: No    Cares/treatment/interventions/medications to be completed following ED care: Pain meds    ED Nurse Name: Melania  ARNULFO Pedroza  6:28 PM   RECEIVING UNIT ED HANDOFF REVIEW    Above ED Nurse Handoff Report was reviewed: Yes  Reviewed by: Tiny Justice RN on September 10, 2023 at 10:32 PM

## 2023-09-10 NOTE — ED TRIAGE NOTES
Pt presents with left sided facial numbness, dizziness. Symptoms started 8/28. Was seen at  8/31 for headache, blurred vision and BL ear pressure. CT head reportedly negative at that time. Was given an RX: Prednisone. Was seen at ENT, who suggested it was migraine. Pt reports +nausea, denies vomiting. Pt reports dyspnea on exertion, denies chest pain. A&OX4.      Triage Assessment       Row Name 09/10/23 1134       Triage Assessment (Adult)    Airway WDL WDL       Respiratory WDL    Respiratory WDL X;rhythm/pattern    Rhythm/Pattern, Respiratory dyspnea on exertion       Skin Circulation/Temperature WDL    Skin Circulation/Temperature WDL WDL       Cardiac WDL    Cardiac WDL WDL       Peripheral/Neurovascular WDL    Peripheral Neurovascular WDL WDL       Cognitive/Neuro/Behavioral WDL    Cognitive/Neuro/Behavioral WDL mood/behavior;X  Pt reports left sided facial numbness, dizziness and recent mood changes.

## 2023-09-10 NOTE — LETTER
[place] [address] [facility]  [address]  [facility] [address]  Phone: [phone]  Fax: [phone]    September 14, 2023        Bernardo Burgos  [address] [facility] [address]          To whom it may concern:    RE: Bernardo Hernandesnce Paulinojorges    Patient was seen and treated and was hospitalized at [facility] for her medical illness.  She can go back to work on September 25, 2023    Please contact me for questions or concerns.      Sincerely,  Sammy Monzon MD

## 2023-09-10 NOTE — H&P
[facility]    Hospitalist Admission Note    Name: Bernardo Burgos    MRN: [id number]  YOB: 1980    Age: 42 year old  Date of admission: 9/10/2023  Primary care provider: Clinic, [facility] [place]  Admitting physician : Thuan Jarvis M.D. ,M.B.A.       Brief summary of admission assessment/MDM:    Bernardo Burgos is a 42 year old  female with no significant past medical problems who presents with new onset of left-sided facial numbness and dizziness which started August 28.  Her symptoms has been intermittent initially but became more progressive seated with blurred vision and headache.  Patient was recently seen by urgent care and later by ENT physician and was suspected to have migraine headache and was given a prescription of prednisone.    MRI of the head on this admission unfortunately revealed lesions suspicious for MS.  Radiologist reported T2 shine through in the left middle cerebral peduncle and questionable enhancement concerning for underlying demyelinating disease with active inflammation.  Minimal scattered white matter T2/FLAIR signal abnormality elsewhere likely represent additional demyelinating plaque.    Neurologist was contacted by ER physician and IV steroid and admission was recommended.      Assessment and Plan       #1.Left sided facial numbness ,dizziness and headache and blurred vision with MRI evidence of lesions suspicious for demyelinating disease likely  new diagnosis of MS.  -- Will admit to medical bed, continue IV steroid with methylprednisolone 1 g IV every day, symptomatic treatment with pain medications, neurology consultation for further recommendations.  Of note patient has a neurologist appointment in October.      Care plan:  # Admission Status: Will admit patient to hospitalist service as inpatient as patient likely need over two mid night stay  in the hospital.    # Diet:Diet advanced    #  Activity:Regular activity    # Education/Counseling :Discussed treatment plan with the patient    # Consults:Inpatient consult with neurology    # VTE prophylactic measures:prophylaxis against venous thromboembolism with ambulate 3 times daily      # Code Status:Full Code    # Disposition:anticipate discharge to home and Anticipate discharge in 3 days        Disclaimer: This note consists of symbols derived from keyboarding, dictation and/or voice recognition software. As a result, there may be errors in the script that have gone undetected. Please consider this when interpreting information found in this chart.             Chief Complaint:     Facial numbness, dizziness and headache  History is obtained from the patient          History of Present Illness:      This patient is a 42 year old  female with a significant past medical history of no known medical problems who presents with the following condition requiring a hospital admission:    Suspected new onset MS with exacerbation    Bernardo Burgos is a 42 year old female presents with dizziness.  She has a notebook to help delineate the history.     8/28/2023 patient developed dizziness and headache was in the posterior aspect of her head and went up the back of her head and was a burning type pain.  This subsided and [place]     8/29 dizziness and headache returned and was worse but not the worst headache of her life.     8/31/2023 she developed blurred vision and went in and had a CT labs and was told she had fluid in her ear and was started on prednisone 20 mg twice a day for 5 days.After this she felt like she was starting to get worse.     On 9/1/2023 she developed double vision and describes it in a diagonal offset.  She then developed numbness into her left lip tip of her tongue left lower jaw and describes or points to distribution that would be consistent with trigeminal neuralgia cranial nerve V branch 3 location.     On  "9/5/2023 she saw ENT and notes that her hearing, inner ear pressures and ear exam was negative for infection or fluid and thought possibly this might be related to migraines.  She notes that he took her had 3 different movements describing somewhat of an Epley maneuver as well as hints exam and noted some abnormalities to the left eye.  He has suggested following up with neurology which she could not get in until October.     Patient also saw optometry or ophthalmology on September 5, 2023 and her eyes were \"okay \".     She notes both doctors thought that possibly the prednisone made her symptoms worse and then it was a migraine and might take some time to go away.     Patient notes that she is not having any ongoing headache or neck pain but is having the paresthesias to the left side of her face as well as unsteadiness and notes sense of being off balance.  She denies any chiropractic work other than some low back issues but no cervical neck manipulations.                 Past Medical History:   No known major medical problem         Past Surgical History:   No surgical procedures          Social History:     Social History     Tobacco Use    Smoking status: Not on file    Smokeless tobacco: Not on file   Substance Use Topics    Alcohol use: Not on file             Family History:     Reviewed and non contributory        Allergies:   No Known Allergies          Medications:        Prior to Admission medications    Medication Sig Last Dose Taking? Auth Provider Long Term End Date   etonogestrel-ethinyl estradiol (NUVARING) 0.12-0.015 MG/24HR vaginal ring Place 1 each vaginally every 28 days   Reported, Patient Yes    LANsoprazole (PREVACID) 30 MG DR capsule Take 30 mg by mouth every morning (before breakfast)   Unknown, Entered By History     Loperamide-Simethicone 2-125 MG TABS Take 1 tablet by mouth 2 times daily as needed   Unknown, Entered By History            Review of Systems:     A Comprehensive greater " than 10 system review of systems was carried out.  Pertinent positives and negatives are noted above in HPI.  Otherwise negative for contributory information.           Physical Exam:     Vital signs were reviewed    Temp:  [98  F (36.7  C)-98.4  F (36.9  C)] 98  F (36.7  C)  Pulse:  [] 104  Resp:  [16-18] 18  BP: (133-166)/(76-95) 149/86  SpO2:  [97 %-98 %] 98 %        GEN: awake, alert, cooperative, no apparent distress, oriented x 3    NECK:Supple ,no mass or thyromegaly     HEENT:  Normocephalic/atraumatic, no scleral icterus, no nasal discharge, mouth moist.    CV:  Regular rate and rhythm, no murmur or JVD.  S1 + S2 noted, no S3 or S4.    LUNGS:  Clear to auscultation bilaterally without rales/rhonchi/wheezing/retractions.  Symmetric chest rise on inhalation noted.    ABD:  Active bowel sounds, soft, non-tender/non-distended.  No rebound/guarding/rigidity.    EXT:  No edema.  No cyanosis.  No joint synovitis noted.Lower extremity pulses are normal bilaterally and     LGS: No cervical or axillary lymphadenopathy     SKIN:  Dry to touch, warm ,no exanthems noted in the visualized areas.    Neurologic:Grossly intact,non focal . No acute focal neurologic deficit     Psychaitric exam: Mood and affect normal          Data:       All laboratory and imaging data in the past 24 hours reviewed     All laboratory data reviewed  Results for orders placed or performed during the hospital encounter of 09/10/23   MR Brain w/o & w Contrast     Status: None    Narrative    EXAM: MRA BRAIN ([place] OF JUNIOR) W/O CONTRAST, MRA NECK (CAROTIDS) W/O and W CONTRAST, MR BRAIN W/O and W CONTRAST  LOCATION: [facility]  DATE: 9/10/2023    INDICATION: see above; Headache; Neurologic deficit, non traumatic; Sensory loss; Neurologic deficit onset > 24 hours; No known automatically detected potential contraindications to imaging  COMPARISON: None.  CONTRAST: 10 mL Gadavist  TECHNIQUE:   1) Routine multiplanar  multisequence head MRI without and with intravenous contrast.  2) 3D time-of-flight head MRA without intravenous contrast.  3) Neck MRA without and with IV contrast. Stenosis measurements made according to NASCET criteria unless otherwise specified.    FINDINGS:  HEAD MRI:  INTRACRANIAL CONTENTS: There is a moderate focus of T2 shine through in the left middle cerebral peduncle. Post contrast imaging is significantly limited at this level due to pulsation artifact from the sigmoid sinus in both the coronal and sagittal   plane. Despite artifact, there appears to be some amount of subtle peripheral enhancement. Findings are suspicious for underlying demyelinating plaque with active information. There are two additional foci of periventricular T2/FLAIR signal abnormality   involving the left frontal white matter and posterior right temporal white matter which are suspicious for demyelinating plaques. These lesions show no abnormal enhancement. The lesion in the left periventricular region shows increased diffusion signal,   likely early characterize T2 shine through. No mass, acute hemorrhage, or extra-axial fluid collections. Normal ventricles and sulci. Normal position of the cerebellar tonsils. No additional pathologic contrast enhancement.    SELLA: No abnormality accounting for technique.    OSSEOUS STRUCTURES/SOFT TISSUES: Normal marrow signal. The major intracranial vascular flow voids are maintained.     ORBITS: No abnormality accounting for technique.     SINUSES/MASTOIDS: No paranasal sinus mucosal disease. No middle ear or mastoid effusion.     HEAD MRA:   ANTERIOR CIRCULATION: No stenosis/occlusion, aneurysm, or high flow vascular malformation. Standard [place] anatomy.    POSTERIOR CIRCULATION: No stenosis/occlusion, aneurysm, or high flow vascular malformation. Balanced vertebral arteries supply a normal basilar artery.     NECK MRA:   RIGHT CAROTID: No measurable stenosis or dissection.    LEFT  CAROTID: No measurable stenosis or dissection.    VERTEBRAL ARTERIES: Irregularity of the vertebral arteries on postcontrast imaging is favored to be artifactual. No vertebral artery irregularity and 2-D time-of-flight imaging. Balanced vertebral arteries.    AORTIC ARCH: Classic aortic arch anatomy with no significant stenosis at the origin of the great vessels.      Impression    IMPRESSION:  HEAD MRI:   1.  T2 shine through in the left middle cerebral peduncle and questionable enhancement is concerning for underlying demyelinating disease with active inflammation.  2.  Minimal scattered white matter T2/FLAIR signal abnormality elsewhere likely represents additional demyelinating plaques. These show no abnormal enhancement.    HEAD MRA:   1.  No aneurysm, high flow AVM or significant stenosis identified.    NECK MRA:  1.  No hemodynamically significant stenosis in the vessels of the neck.  2.  Irregularity vertebral arteries on postcontrast imaging only is favored to be artifactual. Other etiologies such as fibromuscular dysplasia are felt to be less unlikely. A CT angiogram could confirm if clinically indicated.   MRA Angiogram Head w/o Contrast     Status: None    Narrative    EXAM: MRA BRAIN ([place] OF JUNIOR) W/O CONTRAST, MRA NECK (CAROTIDS) W/O and W CONTRAST, MR BRAIN W/O and W CONTRAST  LOCATION: [facility]  DATE: 9/10/2023    INDICATION: see above; Headache; Neurologic deficit, non traumatic; Sensory loss; Neurologic deficit onset > 24 hours; No known automatically detected potential contraindications to imaging  COMPARISON: None.  CONTRAST: 10 mL Gadavist  TECHNIQUE:   1) Routine multiplanar multisequence head MRI without and with intravenous contrast.  2) 3D time-of-flight head MRA without intravenous contrast.  3) Neck MRA without and with IV contrast. Stenosis measurements made according to NASCET criteria unless otherwise specified.    FINDINGS:  HEAD MRI:  INTRACRANIAL  CONTENTS: There is a moderate focus of T2 shine through in the left middle cerebral peduncle. Post contrast imaging is significantly limited at this level due to pulsation artifact from the sigmoid sinus in both the coronal and sagittal   plane. Despite artifact, there appears to be some amount of subtle peripheral enhancement. Findings are suspicious for underlying demyelinating plaque with active information. There are two additional foci of periventricular T2/FLAIR signal abnormality   involving the left frontal white matter and posterior right temporal white matter which are suspicious for demyelinating plaques. These lesions show no abnormal enhancement. The lesion in the left periventricular region shows increased diffusion signal,   likely early characterize T2 shine through. No mass, acute hemorrhage, or extra-axial fluid collections. Normal ventricles and sulci. Normal position of the cerebellar tonsils. No additional pathologic contrast enhancement.    SELLA: No abnormality accounting for technique.    OSSEOUS STRUCTURES/SOFT TISSUES: Normal marrow signal. The major intracranial vascular flow voids are maintained.     ORBITS: No abnormality accounting for technique.     SINUSES/MASTOIDS: No paranasal sinus mucosal disease. No middle ear or mastoid effusion.     HEAD MRA:   ANTERIOR CIRCULATION: No stenosis/occlusion, aneurysm, or high flow vascular malformation. Standard [place] anatomy.    POSTERIOR CIRCULATION: No stenosis/occlusion, aneurysm, or high flow vascular malformation. Balanced vertebral arteries supply a normal basilar artery.     NECK MRA:   RIGHT CAROTID: No measurable stenosis or dissection.    LEFT CAROTID: No measurable stenosis or dissection.    VERTEBRAL ARTERIES: Irregularity of the vertebral arteries on postcontrast imaging is favored to be artifactual. No vertebral artery irregularity and 2-D time-of-flight imaging. Balanced vertebral arteries.    AORTIC ARCH: Classic aortic  arch anatomy with no significant stenosis at the origin of the great vessels.      Impression    IMPRESSION:  HEAD MRI:   1.  T2 shine through in the left middle cerebral peduncle and questionable enhancement is concerning for underlying demyelinating disease with active inflammation.  2.  Minimal scattered white matter T2/FLAIR signal abnormality elsewhere likely represents additional demyelinating plaques. These show no abnormal enhancement.    HEAD MRA:   1.  No aneurysm, high flow AVM or significant stenosis identified.    NECK MRA:  1.  No hemodynamically significant stenosis in the vessels of the neck.  2.  Irregularity vertebral arteries on postcontrast imaging only is favored to be artifactual. Other etiologies such as fibromuscular dysplasia are felt to be less unlikely. A CT angiogram could confirm if clinically indicated.   MRA Angiogram Neck w/o & w Contrast     Status: None    Narrative    EXAM: MRA BRAIN ([place] OF JUNIOR) W/O CONTRAST, MRA NECK (CAROTIDS) W/O and W CONTRAST, MR BRAIN W/O and W CONTRAST  LOCATION: [facility]  DATE: 9/10/2023    INDICATION: see above; Headache; Neurologic deficit, non traumatic; Sensory loss; Neurologic deficit onset > 24 hours; No known automatically detected potential contraindications to imaging  COMPARISON: None.  CONTRAST: 10 mL Gadavist  TECHNIQUE:   1) Routine multiplanar multisequence head MRI without and with intravenous contrast.  2) 3D time-of-flight head MRA without intravenous contrast.  3) Neck MRA without and with IV contrast. Stenosis measurements made according to NASCET criteria unless otherwise specified.    FINDINGS:  HEAD MRI:  INTRACRANIAL CONTENTS: There is a moderate focus of T2 shine through in the left middle cerebral peduncle. Post contrast imaging is significantly limited at this level due to pulsation artifact from the sigmoid sinus in both the coronal and sagittal   plane. Despite artifact, there appears to be some  amount of subtle peripheral enhancement. Findings are suspicious for underlying demyelinating plaque with active information. There are two additional foci of periventricular T2/FLAIR signal abnormality   involving the left frontal white matter and posterior right temporal white matter which are suspicious for demyelinating plaques. These lesions show no abnormal enhancement. The lesion in the left periventricular region shows increased diffusion signal,   likely early characterize T2 shine through. No mass, acute hemorrhage, or extra-axial fluid collections. Normal ventricles and sulci. Normal position of the cerebellar tonsils. No additional pathologic contrast enhancement.    SELLA: No abnormality accounting for technique.    OSSEOUS STRUCTURES/SOFT TISSUES: Normal marrow signal. The major intracranial vascular flow voids are maintained.     ORBITS: No abnormality accounting for technique.     SINUSES/MASTOIDS: No paranasal sinus mucosal disease. No middle ear or mastoid effusion.     HEAD MRA:   ANTERIOR CIRCULATION: No stenosis/occlusion, aneurysm, or high flow vascular malformation. Standard [place] of [place] anatomy.    POSTERIOR CIRCULATION: No stenosis/occlusion, aneurysm, or high flow vascular malformation. Balanced vertebral arteries supply a normal basilar artery.     NECK MRA:   RIGHT CAROTID: No measurable stenosis or dissection.    LEFT CAROTID: No measurable stenosis or dissection.    VERTEBRAL ARTERIES: Irregularity of the vertebral arteries on postcontrast imaging is favored to be artifactual. No vertebral artery irregularity and 2-D time-of-flight imaging. Balanced vertebral arteries.    AORTIC ARCH: Classic aortic arch anatomy with no significant stenosis at the origin of the great vessels.      Impression    IMPRESSION:  HEAD MRI:   1.  T2 shine through in the left middle cerebral peduncle and questionable enhancement is concerning for underlying demyelinating disease with active  inflammation.  2.  Minimal scattered white matter T2/FLAIR signal abnormality elsewhere likely represents additional demyelinating plaques. These show no abnormal enhancement.    HEAD MRA:   1.  No aneurysm, high flow AVM or significant stenosis identified.    NECK MRA:  1.  No hemodynamically significant stenosis in the vessels of the neck.  2.  Irregularity vertebral arteries on postcontrast imaging only is favored to be artifactual. Other etiologies such as fibromuscular dysplasia are felt to be less unlikely. A CT angiogram could confirm if clinically indicated.   Basic metabolic panel (BMP)     Status: Abnormal   Result Value Ref Range    Sodium 137 136 - 145 mmol/L    Potassium 4.0 3.4 - 5.3 mmol/L    Chloride 104 98 - 107 mmol/L    Carbon Dioxide (CO2) 25 22 - 29 mmol/L    Anion Gap 8 7 - 15 mmol/L    Urea Nitrogen 9.6 6.0 - 20.0 mg/dL    Creatinine 0.82 0.51 - 0.95 mg/dL    Calcium 9.2 8.6 - 10.0 mg/dL    Glucose 116 (H) 70 - 99 mg/dL    GFR Estimate >90 >60 mL/min/1.73m2   HCG QUALitative pregnancy (blood)     Status: Normal   Result Value Ref Range    hCG Serum Qualitative Negative Negative   Extra Tube ([place] Draw)     Status: None    Narrative    The following orders were created for panel order Extra Tube ([place] Draw).  Procedure                               Abnormality         Status                     ---------                               -----------         ------                     Extra Blue Top Tube[[id number]]                              Final result                 Please view results for these tests on the individual orders.   CBC with platelets and differential     Status: Abnormal   Result Value Ref Range    WBC Count 11.2 (H) 4.0 - 11.0 10e3/uL    RBC Count 5.20 3.80 - 5.20 10e6/uL    Hemoglobin 13.4 11.7 - 15.7 g/dL    Hematocrit 42.9 35.0 - 47.0 %    MCV 83 78 - 100 fL    MCH 25.8 (L) 26.5 - 33.0 pg    MCHC 31.2 (L) 31.5 - 36.5 g/dL    RDW 14.1 10.0 - 15.0 %    Platelet Count 396  150 - 450 10e3/uL    % Neutrophils 64 %    % Lymphocytes 28 %    % Monocytes 6 %    % Eosinophils 1 %    % Basophils 1 %    % Immature Granulocytes 0 %    NRBCs per 100 WBC 0 <1 /100    Absolute Neutrophils 7.2 1.6 - 8.3 10e3/uL    Absolute Lymphocytes 3.1 0.8 - 5.3 10e3/uL    Absolute Monocytes 0.6 0.0 - 1.3 10e3/uL    Absolute Eosinophils 0.1 0.0 - 0.7 10e3/uL    Absolute Basophils 0.1 0.0 - 0.2 10e3/uL    Absolute Immature Granulocytes 0.0 <=0.4 10e3/uL    Absolute NRBCs 0.0 10e3/uL   Extra Blue Top Tube     Status: None   Result Value Ref Range    Hold Specimen [facility]    EKG 12 lead     Status: None (Preliminary result)   Result Value Ref Range    Systolic Blood Pressure  mmHg    Diastolic Blood Pressure  mmHg    Ventricular Rate 78 BPM    Atrial Rate 78 BPM    IA Interval 118 ms    QRS Duration 72 ms     ms    QTc 408 ms    P Axis 53 degrees    R AXIS -2 degrees    T Axis -6 degrees    Interpretation ECG       Sinus rhythm  Minimal voltage criteria for LVH, may be normal variant  Borderline ECG  When compared with ECG of 30-JUL-2019 11:56,  Vent. rate has decreased BY  50 BPM  Questionable change in QRS axis  T wave inversion now evident in Inferior leads     CBC + differential     Status: Abnormal    Narrative    The following orders were created for panel order CBC + differential.  Procedure                               Abnormality         Status                     ---------                               -----------         ------                     CBC with platelets and d...[[id number]]  Abnormal            Final result                 Please view results for these tests on the individual orders.        Recent Results (from the past 48 hour(s))   MRA Angiogram Head w/o Contrast    Narrative    EXAM: MRA BRAIN ([place] OF JUNIOR) W/O CONTRAST, MRA NECK (CAROTIDS) W/O and W CONTRAST, MR BRAIN W/O and W CONTRAST  LOCATION: [facility]  DATE: 9/10/2023    INDICATION: see above;  Headache; Neurologic deficit, non traumatic; Sensory loss; Neurologic deficit onset > 24 hours; No known automatically detected potential contraindications to imaging  COMPARISON: None.  CONTRAST: 10 mL Gadavist  TECHNIQUE:   1) Routine multiplanar multisequence head MRI without and with intravenous contrast.  2) 3D time-of-flight head MRA without intravenous contrast.  3) Neck MRA without and with IV contrast. Stenosis measurements made according to NASCET criteria unless otherwise specified.    FINDINGS:  HEAD MRI:  INTRACRANIAL CONTENTS: There is a moderate focus of T2 shine through in the left middle cerebral peduncle. Post contrast imaging is significantly limited at this level due to pulsation artifact from the sigmoid sinus in both the coronal and sagittal   plane. Despite artifact, there appears to be some amount of subtle peripheral enhancement. Findings are suspicious for underlying demyelinating plaque with active information. There are two additional foci of periventricular T2/FLAIR signal abnormality   involving the left frontal white matter and posterior right temporal white matter which are suspicious for demyelinating plaques. These lesions show no abnormal enhancement. The lesion in the left periventricular region shows increased diffusion signal,   likely early characterize T2 shine through. No mass, acute hemorrhage, or extra-axial fluid collections. Normal ventricles and sulci. Normal position of the cerebellar tonsils. No additional pathologic contrast enhancement.    SELLA: No abnormality accounting for technique.    OSSEOUS STRUCTURES/SOFT TISSUES: Normal marrow signal. The major intracranial vascular flow voids are maintained.     ORBITS: No abnormality accounting for technique.     SINUSES/MASTOIDS: No paranasal sinus mucosal disease. No middle ear or mastoid effusion.     HEAD MRA:   ANTERIOR CIRCULATION: No stenosis/occlusion, aneurysm, or high flow vascular malformation. Standard [place]  of [place] anatomy.    POSTERIOR CIRCULATION: No stenosis/occlusion, aneurysm, or high flow vascular malformation. Balanced vertebral arteries supply a normal basilar artery.     NECK MRA:   RIGHT CAROTID: No measurable stenosis or dissection.    LEFT CAROTID: No measurable stenosis or dissection.    VERTEBRAL ARTERIES: Irregularity of the vertebral arteries on postcontrast imaging is favored to be artifactual. No vertebral artery irregularity and 2-D time-of-flight imaging. Balanced vertebral arteries.    AORTIC ARCH: Classic aortic arch anatomy with no significant stenosis at the origin of the great vessels.      Impression    IMPRESSION:  HEAD MRI:   1.  T2 shine through in the left middle cerebral peduncle and questionable enhancement is concerning for underlying demyelinating disease with active inflammation.  2.  Minimal scattered white matter T2/FLAIR signal abnormality elsewhere likely represents additional demyelinating plaques. These show no abnormal enhancement.    HEAD MRA:   1.  No aneurysm, high flow AVM or significant stenosis identified.    NECK MRA:  1.  No hemodynamically significant stenosis in the vessels of the neck.  2.  Irregularity vertebral arteries on postcontrast imaging only is favored to be artifactual. Other etiologies such as fibromuscular dysplasia are felt to be less unlikely. A CT angiogram could confirm if clinically indicated.   MR Brain w/o & w Contrast    Narrative    EXAM: MRA BRAIN ([place]) W/O CONTRAST, MRA NECK ([place]) W/O and W CONTRAST, MR BRAIN W/O and W CONTRAST  LOCATION: [facility]  DATE: 9/10/2023    INDICATION: see above; Headache; Neurologic deficit, non traumatic; Sensory loss; Neurologic deficit onset > 24 hours; No known automatically detected potential contraindications to imaging  COMPARISON: None.  CONTRAST: 10 mL Gadavist  TECHNIQUE:   1) Routine multiplanar multisequence head MRI without and with intravenous contrast.  2)  3D time-of-flight head MRA without intravenous contrast.  3) Neck MRA without and with IV contrast. Stenosis measurements made according to NASCET criteria unless otherwise specified.    FINDINGS:  HEAD MRI:  INTRACRANIAL CONTENTS: There is a moderate focus of T2 shine through in the left middle cerebral peduncle. Post contrast imaging is significantly limited at this level due to pulsation artifact from the sigmoid sinus in both the coronal and sagittal   plane. Despite artifact, there appears to be some amount of subtle peripheral enhancement. Findings are suspicious for underlying demyelinating plaque with active information. There are two additional foci of periventricular T2/FLAIR signal abnormality   involving the left frontal white matter and posterior right temporal white matter which are suspicious for demyelinating plaques. These lesions show no abnormal enhancement. The lesion in the left periventricular region shows increased diffusion signal,   likely early characterize T2 shine through. No mass, acute hemorrhage, or extra-axial fluid collections. Normal ventricles and sulci. Normal position of the cerebellar tonsils. No additional pathologic contrast enhancement.    SELLA: No abnormality accounting for technique.    OSSEOUS STRUCTURES/SOFT TISSUES: Normal marrow signal. The major intracranial vascular flow voids are maintained.     ORBITS: No abnormality accounting for technique.     SINUSES/MASTOIDS: No paranasal sinus mucosal disease. No middle ear or mastoid effusion.     HEAD MRA:   ANTERIOR CIRCULATION: No stenosis/occlusion, aneurysm, or high flow vascular malformation. Standard [place] anatomy.    POSTERIOR CIRCULATION: No stenosis/occlusion, aneurysm, or high flow vascular malformation. Balanced vertebral arteries supply a normal basilar artery.     NECK MRA:   RIGHT CAROTID: No measurable stenosis or dissection.    LEFT CAROTID: No measurable stenosis or dissection.    VERTEBRAL  ARTERIES: Irregularity of the vertebral arteries on postcontrast imaging is favored to be artifactual. No vertebral artery irregularity and 2-D time-of-flight imaging. Balanced vertebral arteries.    AORTIC ARCH: Classic aortic arch anatomy with no significant stenosis at the origin of the great vessels.      Impression    IMPRESSION:  HEAD MRI:   1.  T2 shine through in the left middle cerebral peduncle and questionable enhancement is concerning for underlying demyelinating disease with active inflammation.  2.  Minimal scattered white matter T2/FLAIR signal abnormality elsewhere likely represents additional demyelinating plaques. These show no abnormal enhancement.    HEAD MRA:   1.  No aneurysm, high flow AVM or significant stenosis identified.    NECK MRA:  1.  No hemodynamically significant stenosis in the vessels of the neck.  2.  Irregularity vertebral arteries on postcontrast imaging only is favored to be artifactual. Other etiologies such as fibromuscular dysplasia are felt to be less unlikely. A CT angiogram could confirm if clinically indicated.   MRA Angiogram Neck w/o & w Contrast    Narrative    EXAM: MRA BRAIN ([place]) W/O CONTRAST, MRA NECK ([facility]) W/O and W CONTRAST, MR BRAIN W/O and W CONTRAST  LOCATION: [facility]  DATE: 9/10/2023    INDICATION: see above; Headache; Neurologic deficit, non traumatic; Sensory loss; Neurologic deficit onset > 24 hours; No known automatically detected potential contraindications to imaging  COMPARISON: None.  CONTRAST: 10 mL Gadavist  TECHNIQUE:   1) Routine multiplanar multisequence head MRI without and with intravenous contrast.  2) 3D time-of-flight head MRA without intravenous contrast.  3) Neck MRA without and with IV contrast. Stenosis measurements made according to NASCET criteria unless otherwise specified.    FINDINGS:  HEAD MRI:  INTRACRANIAL CONTENTS: There is a moderate focus of T2 shine through in the left middle  cerebral peduncle. Post contrast imaging is significantly limited at this level due to pulsation artifact from the sigmoid sinus in both the coronal and sagittal   plane. Despite artifact, there appears to be some amount of subtle peripheral enhancement. Findings are suspicious for underlying demyelinating plaque with active information. There are two additional foci of periventricular T2/FLAIR signal abnormality   involving the left frontal white matter and posterior right temporal white matter which are suspicious for demyelinating plaques. These lesions show no abnormal enhancement. The lesion in the left periventricular region shows increased diffusion signal,   likely early characterize T2 shine through. No mass, acute hemorrhage, or extra-axial fluid collections. Normal ventricles and sulci. Normal position of the cerebellar tonsils. No additional pathologic contrast enhancement.    SELLA: No abnormality accounting for technique.    OSSEOUS STRUCTURES/SOFT TISSUES: Normal marrow signal. The major intracranial vascular flow voids are maintained.     ORBITS: No abnormality accounting for technique.     SINUSES/MASTOIDS: No paranasal sinus mucosal disease. No middle ear or mastoid effusion.     HEAD MRA:   ANTERIOR CIRCULATION: No stenosis/occlusion, aneurysm, or high flow vascular malformation. Standard [place] anatomy.    POSTERIOR CIRCULATION: No stenosis/occlusion, aneurysm, or high flow vascular malformation. Balanced vertebral arteries supply a normal basilar artery.     NECK MRA:   RIGHT CAROTID: No measurable stenosis or dissection.    LEFT CAROTID: No measurable stenosis or dissection.    VERTEBRAL ARTERIES: Irregularity of the vertebral arteries on postcontrast imaging is favored to be artifactual. No vertebral artery irregularity and 2-D time-of-flight imaging. Balanced vertebral arteries.    AORTIC ARCH: Classic aortic arch anatomy with no significant stenosis at the origin of the great  vessels.      Impression    IMPRESSION:  HEAD MRI:   1.  T2 shine through in the left middle cerebral peduncle and questionable enhancement is concerning for underlying demyelinating disease with active inflammation.  2.  Minimal scattered white matter T2/FLAIR signal abnormality elsewhere likely represents additional demyelinating plaques. These show no abnormal enhancement.    HEAD MRA:   1.  No aneurysm, high flow AVM or significant stenosis identified.    NECK MRA:  1.  No hemodynamically significant stenosis in the vessels of the neck.  2.  Irregularity vertebral arteries on postcontrast imaging only is favored to be artifactual. Other etiologies such as fibromuscular dysplasia are felt to be less unlikely. A CT angiogram could confirm if clinically indicated.                All imaging studies reviewed by me.     Patient`s old medical records reviewed and case discussed with the ED physician.    ED course-Reviewed  and care plan discussed with Nicholas Marshall MD

## 2023-09-11 LAB
ANION GAP SERPL CALCULATED.3IONS-SCNC: 10 MMOL/L (ref 7–15)
ATRIAL RATE - MUSE: 78 BPM
BUN SERPL-MCNC: 14.6 MG/DL (ref 6–20)
CALCIUM SERPL-MCNC: 10.1 MG/DL (ref 8.6–10)
CHLORIDE SERPL-SCNC: 102 MMOL/L (ref 98–107)
CREAT SERPL-MCNC: 0.76 MG/DL (ref 0.51–0.95)
DEPRECATED HCO3 PLAS-SCNC: 24 MMOL/L (ref 22–29)
DIASTOLIC BLOOD PRESSURE - MUSE: NORMAL MMHG
EGFRCR SERPLBLD CKD-EPI 2021: >90 ML/MIN/1.73M2
ERYTHROCYTE [DISTWIDTH] IN BLOOD BY AUTOMATED COUNT: 14.1 % (ref 10–15)
ERYTHROCYTE [SEDIMENTATION RATE] IN BLOOD BY WESTERGREN METHOD: 10 MM/HR (ref 0–20)
GLUCOSE SERPL-MCNC: 165 MG/DL (ref 70–99)
HBA1C MFR BLD: 6 %
HCT VFR BLD AUTO: 42.7 % (ref 35–47)
HGB BLD-MCNC: 13.3 G/DL (ref 11.7–15.7)
INTERPRETATION ECG - MUSE: NORMAL
MAGNESIUM SERPL-MCNC: 1.8 MG/DL (ref 1.7–2.3)
MCH RBC QN AUTO: 25.4 PG (ref 26.5–33)
MCHC RBC AUTO-ENTMCNC: 31.1 G/DL (ref 31.5–36.5)
MCV RBC AUTO: 82 FL (ref 78–100)
P AXIS - MUSE: 53 DEGREES
PLATELET # BLD AUTO: 446 10E3/UL (ref 150–450)
POTASSIUM SERPL-SCNC: 4.8 MMOL/L (ref 3.4–5.3)
PR INTERVAL - MUSE: 118 MS
QRS DURATION - MUSE: 72 MS
QT - MUSE: 358 MS
QTC - MUSE: 408 MS
R AXIS - MUSE: -2 DEGREES
RBC # BLD AUTO: 5.23 10E6/UL (ref 3.8–5.2)
SODIUM SERPL-SCNC: 136 MMOL/L (ref 136–145)
SYSTOLIC BLOOD PRESSURE - MUSE: NORMAL MMHG
T AXIS - MUSE: -6 DEGREES
VENTRICULAR RATE- MUSE: 78 BPM
WBC # BLD AUTO: 10.3 10E3/UL (ref 4–11)

## 2023-09-11 PROCEDURE — 83036 HEMOGLOBIN GLYCOSYLATED A1C: CPT | Performed by: INTERNAL MEDICINE

## 2023-09-11 PROCEDURE — 86618 LYME DISEASE ANTIBODY: CPT | Performed by: PSYCHIATRY & NEUROLOGY

## 2023-09-11 PROCEDURE — 36415 COLL VENOUS BLD VENIPUNCTURE: CPT | Performed by: PSYCHIATRY & NEUROLOGY

## 2023-09-11 PROCEDURE — 250N000013 HC RX MED GY IP 250 OP 250 PS 637: Performed by: INTERNAL MEDICINE

## 2023-09-11 PROCEDURE — 120N000001 HC R&B MED SURG/OB

## 2023-09-11 PROCEDURE — 83735 ASSAY OF MAGNESIUM: CPT | Performed by: INTERNAL MEDICINE

## 2023-09-11 PROCEDURE — 250N000011 HC RX IP 250 OP 636: Performed by: INTERNAL MEDICINE

## 2023-09-11 PROCEDURE — 99232 SBSQ HOSP IP/OBS MODERATE 35: CPT | Performed by: INTERNAL MEDICINE

## 2023-09-11 PROCEDURE — 258N000003 HC RX IP 258 OP 636: Performed by: INTERNAL MEDICINE

## 2023-09-11 PROCEDURE — 86038 ANTINUCLEAR ANTIBODIES: CPT | Performed by: PSYCHIATRY & NEUROLOGY

## 2023-09-11 PROCEDURE — 85652 RBC SED RATE AUTOMATED: CPT | Performed by: PSYCHIATRY & NEUROLOGY

## 2023-09-11 PROCEDURE — 86431 RHEUMATOID FACTOR QUANT: CPT | Performed by: PSYCHIATRY & NEUROLOGY

## 2023-09-11 PROCEDURE — 85027 COMPLETE CBC AUTOMATED: CPT | Performed by: INTERNAL MEDICINE

## 2023-09-11 PROCEDURE — 36415 COLL VENOUS BLD VENIPUNCTURE: CPT | Performed by: INTERNAL MEDICINE

## 2023-09-11 PROCEDURE — 80048 BASIC METABOLIC PNL TOTAL CA: CPT | Performed by: INTERNAL MEDICINE

## 2023-09-11 RX ORDER — ACETAMINOPHEN 325 MG/1
650 TABLET ORAL EVERY 4 HOURS PRN
Status: DISCONTINUED | OUTPATIENT
Start: 2023-09-11 | End: 2023-09-15 | Stop reason: HOSPADM

## 2023-09-11 RX ORDER — CALCIUM CARBONATE 500 MG/1
1000 TABLET, CHEWABLE ORAL 3 TIMES DAILY PRN
Status: DISCONTINUED | OUTPATIENT
Start: 2023-09-11 | End: 2023-09-15 | Stop reason: HOSPADM

## 2023-09-11 RX ORDER — ACETAMINOPHEN 650 MG/1
650 SUPPOSITORY RECTAL EVERY 4 HOURS PRN
Status: DISCONTINUED | OUTPATIENT
Start: 2023-09-11 | End: 2023-09-15 | Stop reason: HOSPADM

## 2023-09-11 RX ORDER — FAMOTIDINE 20 MG/1
20 TABLET, FILM COATED ORAL 2 TIMES DAILY PRN
Status: DISCONTINUED | OUTPATIENT
Start: 2023-09-11 | End: 2023-09-15 | Stop reason: HOSPADM

## 2023-09-11 RX ORDER — MECLIZINE HYDROCHLORIDE 25 MG/1
25 TABLET ORAL 3 TIMES DAILY PRN
Status: DISCONTINUED | OUTPATIENT
Start: 2023-09-11 | End: 2023-09-15 | Stop reason: HOSPADM

## 2023-09-11 RX ORDER — LANSOPRAZOLE 30 MG/1
30 CAPSULE, DELAYED RELEASE ORAL
Status: DISCONTINUED | OUTPATIENT
Start: 2023-09-12 | End: 2023-09-15 | Stop reason: HOSPADM

## 2023-09-11 RX ADMIN — CALCIUM CARBONATE (ANTACID) CHEW TAB 500 MG 1000 MG: 500 CHEW TAB at 02:31

## 2023-09-11 RX ADMIN — CALCIUM CARBONATE (ANTACID) CHEW TAB 500 MG 1000 MG: 500 CHEW TAB at 11:30

## 2023-09-11 RX ADMIN — SODIUM CHLORIDE 1000 MG: 9 INJECTION, SOLUTION INTRAVENOUS at 18:53

## 2023-09-11 RX ADMIN — PANTOPRAZOLE SODIUM 40 MG: 40 TABLET, DELAYED RELEASE ORAL at 06:17

## 2023-09-11 RX ADMIN — ACETAMINOPHEN 650 MG: 325 TABLET, FILM COATED ORAL at 09:00

## 2023-09-11 RX ADMIN — ACETAMINOPHEN 650 MG: 325 TABLET, FILM COATED ORAL at 16:42

## 2023-09-11 ASSESSMENT — ACTIVITIES OF DAILY LIVING (ADL)
CHANGE_IN_FUNCTIONAL_STATUS_SINCE_ONSET_OF_CURRENT_ILLNESS/INJURY: NO
ADLS_ACUITY_SCORE: 20
FALL_HISTORY_WITHIN_LAST_SIX_MONTHS: NO
DIFFICULTY_EATING/SWALLOWING: NO
TRANSFERRING: 0-->INDEPENDENT
ADLS_ACUITY_SCORE: 35
TRANSFERRING: 0-->INDEPENDENT
ADLS_ACUITY_SCORE: 20
CONCENTRATING,_REMEMBERING_OR_MAKING_DECISIONS_DIFFICULTY: NO
TOILETING_ISSUES: NO
WALKING_OR_CLIMBING_STAIRS_DIFFICULTY: YES
DOING_ERRANDS_INDEPENDENTLY_DIFFICULTY: NO
WALKING_OR_CLIMBING_STAIRS: OTHER (SEE COMMENTS)
VISION_MANAGEMENT: GLASSES
DRESSING/BATHING_DIFFICULTY: NO
WEAR_GLASSES_OR_BLIND: YES
ADLS_ACUITY_SCORE: 20

## 2023-09-11 NOTE — PHARMACY-ADMISSION MEDICATION HISTORY
Pharmacist Admission Medication History    Admission medication history is complete. The information provided in this note is only as accurate as the sources available at the time of the update.    Medication reconciliation/reorder completed by provider prior to medication history? No    Information Source(s): Patient and CareEverywhere/SureScripts via in-person    Pertinent Information: None    Changes made to PTA medication list:  Added: flonase  Deleted: Imodium, Nuvaring  Changed: None    Medication Affordability:  Not including over the counter (OTC) medications, was there a time in the past 3 months when you did not take your medications as prescribed because of cost?: No    Allergies reviewed with patient and updates made in EHR: no    Medication History Completed By: Gaye Michelle RPH 9/10/2023 7:14 PM    Prior to Admission medications    Medication Sig Last Dose Taking? Auth Provider Long Term End Date   fluticasone (FLONASE) 50 MCG/ACT nasal spray Spray 1-2 sprays into both nostrils daily as needed Unknown at PRN Yes Unknown, Entered By History     LANsoprazole (PREVACID) 30 MG DR capsule Take 30 mg by mouth every morning (before breakfast) 9/10/2023 Yes Unknown, Entered By History

## 2023-09-11 NOTE — PLAN OF CARE
Slept well. A/O, calls appropriately.     Major Shift Events:  TUMS for heartburn otherwise uneventful night, slept well. No significant change in condition.     Treatment Plan:   Solumedrol given in ER. Neurology consult. Symptom management.

## 2023-09-11 NOTE — PROGRESS NOTES
Agree with the history and physical as described by Student Doctor Ashia Marin.    Today's Plan: Appreciate Neurology recommendations.  Continue IV solumedrol for 5 days.  Ok for lansoprazole.    ASSESSMENT/PLAN:  Diplopia  Facial Paresthesias  Suspected Multiple Sclerosis  - Appreciate Neurology recommendations  - MRI brain and MRA head and neck showed T2 shine within the left middle cerebral peduncle and questionable enhancement concerning for underlying demyelinating disease with active inflammation, scattered T2 signal abnormality elsewhere, irregularity vertebral arteries on postcontrast imaging favored to be artifactual  - IV solumedrol 1000 mg daily for 5 days  - Recommend outpatient Neurology follow up    Hyperglycemia  - A1C = 6.0  - Suspect pt will have some steroid induced hyperglycemia  - Monitor for now, but may need ISS if BS continue to be elevated    GERD  - Resume PTA lansoprazole    ------------------------------------------------------------------------------------------------------------    [facility]    Progress Note - Hospitalist Service       Date of Admission:  9/10/2023    Assessment & Plan   Bernardo Burgos is a 42 year old female admitted on 9/10/2023. She has no significant past medical history and is admitted for dizziness, diplopia, facial paresthesias -- likely new diagnosis of MS.    Likely Relapsing Remitting MS, new diagnosis  -Continue methylprednisolone 1g IV daily until discharge  -Continue symptomatic headache management with Tylenol 650mg PRN  -Re-order PRN meclizine 25mg for dizziness  -Neurology consult ordered  -Follow-up appointment with neurology scheduled for October with [facility] in [place], [place]         Diet: Combination Diet Regular Diet Adult    DVT Prophylaxis: Low Risk/Ambulatory with no VTE prophylaxis indicated  Aguilar Catheter: Not present  Fluids: None  Lines: None     Cardiac Monitoring: None  Code Status: Full  Code      Clinically Significant Risk Factors Present on Admission                                  Disposition Plan     Expected Discharge Date: 09/12/2023                The patient's care was discussed with the Attending Physician, Dr. Lake .    HARMONY PHILIPPE, MS3  Medical Student  Hospitalist Service  [facility]  Securely message with readeo (more info)  Text page via [facility] Paging/Directory   ______________________________________________________________________    Interval History   No acute events overnight. After receiving first dose of methylprednisolone last night, she noticed major improvement in vision, diplopia resolved overnight. Continues to experience some dizziness upon turning head to either side, L-sided facial paresthesias on jaw, side of tongue, lips. No dysphagia or slurred speech. Headache this morning relieved by Tylenol. Some nausea this morning, relieved by eating. After standing up and taking a shower this morning, she notes feeling a bit more dizzy and increased facial paresthesias. Overall, she feels better than yesterday but continues to experience mild symptoms of dizziness, headache, and facial paresthesias.    Physical Exam   Vital Signs: Temp: 98.3  F (36.8  C) Temp src: Oral BP: (!) 140/64 Pulse: 98   Resp: 18 SpO2: 94 % O2 Device: None (Room air)    Weight: 208 lbs 15.94 oz    Constitutional: awake, alert, cooperative, no apparent distress, and appears stated age  Eyes: pupils equal, round and reactive to light, extra-ocular muscles intact, sclera clear, conjunctiva normal, and vision intact  ENT: Normocephalic, without obvious abnormality, atraumatic, sinuses nontender on palpation, external ears without lesions, oral pharynx with moist mucous membranes, tonsils without erythema or exudates, gums normal and good dentition.  Hematologic / Lymphatic: no cervical lymphadenopathy and no supraclavicular lymphadenopathy  Respiratory: No increased work of  breathing, good air exchange, clear to auscultation bilaterally, no crackles or wheezing  Cardiovascular: Normal apical impulse, regular rate and rhythm, normal S1 and S2, no S3 or S4, and no murmur noted  GI: No scars, normal bowel sounds, soft, non-distended, non-tender, no masses palpated, no hepatosplenomegally  Skin: no bruising or bleeding, normal skin color, texture, turgor, and no redness, warmth, or swelling  Neurologic: Awake, alert, oriented to name, place and time.  Cranial nerves II-XII are grossly intact.  Motor is 5 out of 5 bilaterally.  Cerebellar finger to nose, heel to shin intact.  Sensory is intact.  Babinski down going, Romberg negative, and gait is normal.  Cranial Nerves:  cranial nerves II-XII are grossly intact; mild dysmetria noted on left Finger to Nose testing  Neuropsychiatric: General: normal, calm, and normal eye contact  Affect: normal and pleasant  Orientation: oriented to self, place, time and situation    Medical Decision Making       Please see A&P for additional details of medical decision making.      Data     I have personally reviewed the following data over the past 24 hrs:    10.3  \   13.3   / 446     136 102 14.6 /  165 (H)   4.8 24 0.76 \       Imaging results reviewed over the past 24 hrs:   Recent Results (from the past 24 hour(s))   MRA Angiogram Head w/o Contrast    Narrative    EXAM: MRA BRAIN ([place] OF JUNIOR) W/O CONTRAST, MRA NECK (CAROTIDS) W/O and W CONTRAST, MR BRAIN W/O and W CONTRAST  LOCATION: [facility]  DATE: 9/10/2023    INDICATION: see above; Headache; Neurologic deficit, non traumatic; Sensory loss; Neurologic deficit onset > 24 hours; No known automatically detected potential contraindications to imaging  COMPARISON: None.  CONTRAST: 10 mL Gadavist  TECHNIQUE:   1) Routine multiplanar multisequence head MRI without and with intravenous contrast.  2) 3D time-of-flight head MRA without intravenous contrast.  3) Neck MRA without  and with IV contrast. Stenosis measurements made according to NASCET criteria unless otherwise specified.    FINDINGS:  HEAD MRI:  INTRACRANIAL CONTENTS: There is a moderate focus of T2 shine through in the left middle cerebral peduncle. Post contrast imaging is significantly limited at this level due to pulsation artifact from the sigmoid sinus in both the coronal and sagittal   plane. Despite artifact, there appears to be some amount of subtle peripheral enhancement. Findings are suspicious for underlying demyelinating plaque with active information. There are two additional foci of periventricular T2/FLAIR signal abnormality   involving the left frontal white matter and posterior right temporal white matter which are suspicious for demyelinating plaques. These lesions show no abnormal enhancement. The lesion in the left periventricular region shows increased diffusion signal,   likely early characterize T2 shine through. No mass, acute hemorrhage, or extra-axial fluid collections. Normal ventricles and sulci. Normal position of the cerebellar tonsils. No additional pathologic contrast enhancement.    SELLA: No abnormality accounting for technique.    OSSEOUS STRUCTURES/SOFT TISSUES: Normal marrow signal. The major intracranial vascular flow voids are maintained.     ORBITS: No abnormality accounting for technique.     SINUSES/MASTOIDS: No paranasal sinus mucosal disease. No middle ear or mastoid effusion.     HEAD MRA:   ANTERIOR CIRCULATION: No stenosis/occlusion, aneurysm, or high flow vascular malformation. Standard [place] anatomy.    POSTERIOR CIRCULATION: No stenosis/occlusion, aneurysm, or high flow vascular malformation. Balanced vertebral arteries supply a normal basilar artery.     NECK MRA:   RIGHT CAROTID: No measurable stenosis or dissection.    LEFT CAROTID: No measurable stenosis or dissection.    VERTEBRAL ARTERIES: Irregularity of the vertebral arteries on postcontrast imaging is favored  to be artifactual. No vertebral artery irregularity and 2-D time-of-flight imaging. Balanced vertebral arteries.    AORTIC ARCH: Classic aortic arch anatomy with no significant stenosis at the origin of the great vessels.      Impression    IMPRESSION:  HEAD MRI:   1.  T2 shine through in the left middle cerebral peduncle and questionable enhancement is concerning for underlying demyelinating disease with active inflammation.  2.  Minimal scattered white matter T2/FLAIR signal abnormality elsewhere likely represents additional demyelinating plaques. These show no abnormal enhancement.    HEAD MRA:   1.  No aneurysm, high flow AVM or significant stenosis identified.    NECK MRA:  1.  No hemodynamically significant stenosis in the vessels of the neck.  2.  Irregularity vertebral arteries on postcontrast imaging only is favored to be artifactual. Other etiologies such as fibromuscular dysplasia are felt to be less unlikely. A CT angiogram could confirm if clinically indicated.   MR Brain w/o & w Contrast    Narrative    EXAM: MRA BRAIN ([place] OF JUNIOR) W/O CONTRAST, MRA NECK (CAROTIDS) W/O and W CONTRAST, MR BRAIN W/O and W CONTRAST  LOCATION: [facility]  DATE: 9/10/2023    INDICATION: see above; Headache; Neurologic deficit, non traumatic; Sensory loss; Neurologic deficit onset > 24 hours; No known automatically detected potential contraindications to imaging  COMPARISON: None.  CONTRAST: 10 mL Gadavist  TECHNIQUE:   1) Routine multiplanar multisequence head MRI without and with intravenous contrast.  2) 3D time-of-flight head MRA without intravenous contrast.  3) Neck MRA without and with IV contrast. Stenosis measurements made according to NASCET criteria unless otherwise specified.    FINDINGS:  HEAD MRI:  INTRACRANIAL CONTENTS: There is a moderate focus of T2 shine through in the left middle cerebral peduncle. Post contrast imaging is significantly limited at this level due to pulsation  artifact from the sigmoid sinus in both the coronal and sagittal   plane. Despite artifact, there appears to be some amount of subtle peripheral enhancement. Findings are suspicious for underlying demyelinating plaque with active information. There are two additional foci of periventricular T2/FLAIR signal abnormality   involving the left frontal white matter and posterior right temporal white matter which are suspicious for demyelinating plaques. These lesions show no abnormal enhancement. The lesion in the left periventricular region shows increased diffusion signal,   likely early characterize T2 shine through. No mass, acute hemorrhage, or extra-axial fluid collections. Normal ventricles and sulci. Normal position of the cerebellar tonsils. No additional pathologic contrast enhancement.    SELLA: No abnormality accounting for technique.    OSSEOUS STRUCTURES/SOFT TISSUES: Normal marrow signal. The major intracranial vascular flow voids are maintained.     ORBITS: No abnormality accounting for technique.     SINUSES/MASTOIDS: No paranasal sinus mucosal disease. No middle ear or mastoid effusion.     HEAD MRA:   ANTERIOR CIRCULATION: No stenosis/occlusion, aneurysm, or high flow vascular malformation. Standard [place] anatomy.    POSTERIOR CIRCULATION: No stenosis/occlusion, aneurysm, or high flow vascular malformation. Balanced vertebral arteries supply a normal basilar artery.     NECK MRA:   RIGHT CAROTID: No measurable stenosis or dissection.    LEFT CAROTID: No measurable stenosis or dissection.    VERTEBRAL ARTERIES: Irregularity of the vertebral arteries on postcontrast imaging is favored to be artifactual. No vertebral artery irregularity and 2-D time-of-flight imaging. Balanced vertebral arteries.    AORTIC ARCH: Classic aortic arch anatomy with no significant stenosis at the origin of the great vessels.      Impression    IMPRESSION:  HEAD MRI:   1.  T2 shine through in the left middle cerebral  peduncle and questionable enhancement is concerning for underlying demyelinating disease with active inflammation.  2.  Minimal scattered white matter T2/FLAIR signal abnormality elsewhere likely represents additional demyelinating plaques. These show no abnormal enhancement.    HEAD MRA:   1.  No aneurysm, high flow AVM or significant stenosis identified.    NECK MRA:  1.  No hemodynamically significant stenosis in the vessels of the neck.  2.  Irregularity vertebral arteries on postcontrast imaging only is favored to be artifactual. Other etiologies such as fibromuscular dysplasia are felt to be less unlikely. A CT angiogram could confirm if clinically indicated.   MRA Angiogram Neck w/o & w Contrast    Narrative    EXAM: MRA BRAIN ([place] OF JUNIOR) W/O CONTRAST, MRA NECK (CAROTIDS) W/O and W CONTRAST, MR BRAIN W/O and W CONTRAST  LOCATION: [facility]  DATE: 9/10/2023    INDICATION: see above; Headache; Neurologic deficit, non traumatic; Sensory loss; Neurologic deficit onset > 24 hours; No known automatically detected potential contraindications to imaging  COMPARISON: None.  CONTRAST: 10 mL Gadavist  TECHNIQUE:   1) Routine multiplanar multisequence head MRI without and with intravenous contrast.  2) 3D time-of-flight head MRA without intravenous contrast.  3) Neck MRA without and with IV contrast. Stenosis measurements made according to NASCET criteria unless otherwise specified.    FINDINGS:  HEAD MRI:  INTRACRANIAL CONTENTS: There is a moderate focus of T2 shine through in the left middle cerebral peduncle. Post contrast imaging is significantly limited at this level due to pulsation artifact from the sigmoid sinus in both the coronal and sagittal   plane. Despite artifact, there appears to be some amount of subtle peripheral enhancement. Findings are suspicious for underlying demyelinating plaque with active information. There are two additional foci of periventricular T2/FLAIR signal  abnormality   involving the left frontal white matter and posterior right temporal white matter which are suspicious for demyelinating plaques. These lesions show no abnormal enhancement. The lesion in the left periventricular region shows increased diffusion signal,   likely early characterize T2 shine through. No mass, acute hemorrhage, or extra-axial fluid collections. Normal ventricles and sulci. Normal position of the cerebellar tonsils. No additional pathologic contrast enhancement.    SELLA: No abnormality accounting for technique.    OSSEOUS STRUCTURES/SOFT TISSUES: Normal marrow signal. The major intracranial vascular flow voids are maintained.     ORBITS: No abnormality accounting for technique.     SINUSES/MASTOIDS: No paranasal sinus mucosal disease. No middle ear or mastoid effusion.     HEAD MRA:   ANTERIOR CIRCULATION: No stenosis/occlusion, aneurysm, or high flow vascular malformation. Standard [place] anatomy.    POSTERIOR CIRCULATION: No stenosis/occlusion, aneurysm, or high flow vascular malformation. Balanced vertebral arteries supply a normal basilar artery.     NECK MRA:   RIGHT CAROTID: No measurable stenosis or dissection.    LEFT CAROTID: No measurable stenosis or dissection.    VERTEBRAL ARTERIES: Irregularity of the vertebral arteries on postcontrast imaging is favored to be artifactual. No vertebral artery irregularity and 2-D time-of-flight imaging. Balanced vertebral arteries.    AORTIC ARCH: Classic aortic arch anatomy with no significant stenosis at the origin of the great vessels.      Impression    IMPRESSION:  HEAD MRI:   1.  T2 shine through in the left middle cerebral peduncle and questionable enhancement is concerning for underlying demyelinating disease with active inflammation.  2.  Minimal scattered white matter T2/FLAIR signal abnormality elsewhere likely represents additional demyelinating plaques. These show no abnormal enhancement.    HEAD MRA:   1.  No aneurysm,  high flow AVM or significant stenosis identified.    NECK MRA:  1.  No hemodynamically significant stenosis in the vessels of the neck.  2.  Irregularity vertebral arteries on postcontrast imaging only is favored to be artifactual. Other etiologies such as fibromuscular dysplasia are felt to be less unlikely. A CT angiogram could confirm if clinically indicated.

## 2023-09-11 NOTE — CONSULTS
Neurology Consult Note  [facility]       [2023]                                                                                       Admission Date: 9/10/2023  Hospital Day: 2      Patient: Bernardo Burgos      : 1980  MRN:  [id number]     CC:      Chief Complaint   Patient presents with    Numbness    Dizziness       Consult Request:  Referring Provider:  Thuan Jarvis MD  Primary Care Provider:  Manuel, [facility] Lake MD        HPI:  Bernardo Burgos is a 42 year old yo female admitted for new onset dizziness, blurred vision, double vision and gait imbalance since 23. Patient documented her symptoms as follows:  2023 patient developed dizziness and headache was in the posterior aspect of her head and went up the back of her head and was a burning type pain.  This subsided and [place]      dizziness and headache returned and was worse but not the worst headache of her life.     2023 she developed blurred vision and went in and had a CT labs and was told she had fluid in her ear and was started on prednisone 20 mg twice a day for 5 days.After this she felt like she was starting to get worse.     On 2023 she developed double vision and describes it in a diagonal offset.  She then developed numbness into her left lip tip of her tongue left lower jaw and describes or points to distribution that would be consistent with trigeminal neuralgia cranial nerve V branch 3 location.     On 2023 she saw ENT and notes that her hearing, inner ear pressures and ear exam was negative for infection or fluid and thought possibly this might be related to migraines    She denies any similar symptoms in the past. She has noted increased frequency and urge with minimal urination.  She is feeling better after IV Solumedrol.            A complete review of symptoms was performed including vascular, infectious,  "cardiovascular, pulmonary, gastrointestinal, endocrinological, hematologic, dermatologic, musculoskeletal, and neurological. All were normal except as above.    PAST MEDICAL HISTORY:  ALLERGIES: No Known Allergies  Tobacco:    History   Smoking Status    Not on file   Smokeless Tobacco    Not on file     Alcohol:  Social History    Substance and Sexual Activity      Alcohol use: Not on file    MEDICATIONS:       CURRENTLY SCHEDULED MEDICATIONS    [START ON 2023] LANsoprazole  30 mg Oral QAM AC    methylPREDNISolone  1,000 mg Intravenous Q24H    sodium chloride (PF)  3 mL Intracatheter Q8H          HOME MEDICATIONS  Medications Prior to Admission   Medication Sig Dispense Refill Last Dose    fluticasone (FLONASE) 50 MCG/ACT nasal spray Spray 1-2 sprays into both nostrils daily as needed   Unknown at PRN    LANsoprazole (PREVACID) 30 MG DR capsule Take 30 mg by mouth every morning (before breakfast)   9/10/2023 at AM     MEDICAL HISTORY  No past medical history on file.  SURGICAL HISTORY  No past surgical history on file.  FAMILY HISTORY    No family history on file.  SOCIAL HISTORY  Social History     Socioeconomic History    Marital status:             Height: 170.2 cm (5' 7\")     Temp: 98.4  F (36.9  C)   Weight: 94.8 kg (208 lb 15.9 oz)    Temp src: Oral         BP: (!) 144/63         Estimated body mass index is 32.73 kg/m  as calculated from the following:    Height as of this encounter: 1.702 m (5' 7\").    Weight as of this encounter: 94.8 kg (208 lb 15.9 oz).    Resp: 16   SpO2: 93 %   O2 Device: None (Room air)     Blood Pressure:   BP Readings from Last 3 Encounters:   23 (!) 144/63   19 129/64   19 131/77     T24 : Temp (24hrs), Av.4  F (36.9  C), Min:98.3  F (36.8  C), Max:98.4  F (36.9  C)       GENERAL EXAMINATION:  HEENT: PERRLA, EOMI.  Neck: Supple, No carotid bruits. No myofascial tender points.      NEUROLOGICAL EXAMINATION  Mental Status:  Patient is awake, alert, " and oriented X3. Speech and language functions are normal. Short- and long-term memory are intact.      Cranial Nerves: Pupils are equal and reactive to light.Visual fields are full to confrontation. Extraocular movements are intact. There is no nystagmus in the horizontal or vertical planes.No facial sensory deficits. No facial weakness. The tongue is midline.     Motor: Muscle tone is normal. There is no pronator drift. There is no muscle atrophy. The strength is 5/5 in upper and lower extremities bilaterally. Deep tendon reflexes are brisk in both lower extremities with no clonus.. Plantars are flexor. .     Sensory: Patient has normal pin prick and light touch sensation in the upper and lower extremities.     Coordination: .Finger to nose- left dysmetria. heel to shin testing- minimal dysmetria on left    Gait: Patient could not do tandem walk.  .  LABORATORY RESULTS      Recent Labs   Lab 09/11/23  0649 09/10/23  1153   WBC 10.3 11.2*   HGB 13.3 13.4   HCT 42.7 42.9   MCV 82 83    396     Recent Labs   Lab 09/11/23  0649 09/10/23  1153    137   POTASSIUM 4.8 4.0   CHLORIDE 102 104   CO2 24 25   ANIONGAP 10 8   * 116*   BUN 14.6 9.6   CR 0.76 0.82   GFRESTIMATED >90 >90   FANY 10.1* 9.2   MAG 1.8  --      No results for input(s): COLOR, APPEARANCE, URINEGLC, URINEBILI, URINEKETONE, SG, UBLD, URINEPH, PROTEIN, UROBILINOGEN, NITRITE, LEUKEST, RBCU, WBCU in the last 168 hours.    IMAGING RESULTS     HEAD MRI:  INTRACRANIAL CONTENTS: There is a moderate focus of T2 shine through in the left middle cerebral peduncle. Post contrast imaging is significantly limited at this level due to pulsation artifact from the sigmoid sinus in both the coronal and sagittal   plane. Despite artifact, there appears to be some amount of subtle peripheral enhancement. Findings are suspicious for underlying demyelinating plaque with active information. There are two additional foci of periventricular T2/FLAIR signal  abnormality   involving the left frontal white matter and posterior right temporal white matter which are suspicious for demyelinating plaques. These lesions show no abnormal enhancement. The lesion in the left periventricular region shows increased diffusion signal,   likely early characterize T2 shine through. No mass, acute hemorrhage, or extra-axial fluid collections. Normal ventricles and sulci. Normal position of the cerebellar tonsils. No additional pathologic contrast enhancement.     SELLA: No abnormality accounting for technique.     OSSEOUS STRUCTURES/SOFT TISSUES: Normal marrow signal. The major intracranial vascular flow voids are maintained.      ORBITS: No abnormality accounting for technique.      SINUSES/MASTOIDS: No paranasal sinus mucosal disease. No middle ear or mastoid effusion.     _____________________________________________________________________________    _____________________________________________________________________________          ASSESSMENT     Dizziness and double vision since 8/28/23- MRI shows active demyelinating lesion in left cerebral peduncle with old plaques in posterior left frontal and right temporal areas. This meets Sewell criteria for RRMS.        RECOMMENDATIONS   MRI Cervical and Thoracic spine with and without yong.  Lyme's titre, ACE level, ESR, CRISTINA, Rheumatoid factor

## 2023-09-12 ENCOUNTER — APPOINTMENT (OUTPATIENT)
Dept: MRI IMAGING | Facility: CLINIC | Age: 43
DRG: 060 | End: 2023-09-12
Attending: PSYCHIATRY & NEUROLOGY
Payer: COMMERCIAL

## 2023-09-12 LAB
ANION GAP SERPL CALCULATED.3IONS-SCNC: 13 MMOL/L (ref 7–15)
B BURGDOR IGG+IGM SER QL: 0.1
BUN SERPL-MCNC: 19.1 MG/DL (ref 6–20)
CALCIUM SERPL-MCNC: 9.4 MG/DL (ref 8.6–10)
CHLORIDE SERPL-SCNC: 105 MMOL/L (ref 98–107)
CREAT SERPL-MCNC: 0.76 MG/DL (ref 0.51–0.95)
DEPRECATED HCO3 PLAS-SCNC: 22 MMOL/L (ref 22–29)
EGFRCR SERPLBLD CKD-EPI 2021: >90 ML/MIN/1.73M2
ERYTHROCYTE [DISTWIDTH] IN BLOOD BY AUTOMATED COUNT: 14.5 % (ref 10–15)
GLUCOSE SERPL-MCNC: 158 MG/DL (ref 70–99)
HCT VFR BLD AUTO: 40.7 % (ref 35–47)
HGB BLD-MCNC: 12.9 G/DL (ref 11.7–15.7)
MAGNESIUM SERPL-MCNC: 2.1 MG/DL (ref 1.7–2.3)
MCH RBC QN AUTO: 25.6 PG (ref 26.5–33)
MCHC RBC AUTO-ENTMCNC: 31.7 G/DL (ref 31.5–36.5)
MCV RBC AUTO: 81 FL (ref 78–100)
PLATELET # BLD AUTO: 422 10E3/UL (ref 150–450)
POTASSIUM SERPL-SCNC: 5.3 MMOL/L (ref 3.4–5.3)
RBC # BLD AUTO: 5.03 10E6/UL (ref 3.8–5.2)
RHEUMATOID FACT SER NEPH-ACNC: 9 IU/ML
SODIUM SERPL-SCNC: 140 MMOL/L (ref 136–145)
WBC # BLD AUTO: 19.1 10E3/UL (ref 4–11)

## 2023-09-12 PROCEDURE — 258N000003 HC RX IP 258 OP 636: Performed by: INTERNAL MEDICINE

## 2023-09-12 PROCEDURE — 250N000011 HC RX IP 250 OP 636: Performed by: INTERNAL MEDICINE

## 2023-09-12 PROCEDURE — A9585 GADOBUTROL INJECTION: HCPCS | Performed by: INTERNAL MEDICINE

## 2023-09-12 PROCEDURE — 85027 COMPLETE CBC AUTOMATED: CPT | Performed by: INTERNAL MEDICINE

## 2023-09-12 PROCEDURE — 120N000001 HC R&B MED SURG/OB

## 2023-09-12 PROCEDURE — 250N000013 HC RX MED GY IP 250 OP 250 PS 637: Performed by: INTERNAL MEDICINE

## 2023-09-12 PROCEDURE — 36415 COLL VENOUS BLD VENIPUNCTURE: CPT | Performed by: INTERNAL MEDICINE

## 2023-09-12 PROCEDURE — 80048 BASIC METABOLIC PNL TOTAL CA: CPT | Performed by: INTERNAL MEDICINE

## 2023-09-12 PROCEDURE — 72156 MRI NECK SPINE W/O & W/DYE: CPT

## 2023-09-12 PROCEDURE — 83735 ASSAY OF MAGNESIUM: CPT | Performed by: INTERNAL MEDICINE

## 2023-09-12 PROCEDURE — 72157 MRI CHEST SPINE W/O & W/DYE: CPT

## 2023-09-12 PROCEDURE — 99232 SBSQ HOSP IP/OBS MODERATE 35: CPT | Performed by: INTERNAL MEDICINE

## 2023-09-12 PROCEDURE — 255N000002 HC RX 255 OP 636: Performed by: INTERNAL MEDICINE

## 2023-09-12 RX ORDER — GADOBUTROL 604.72 MG/ML
9 INJECTION INTRAVENOUS ONCE
Status: COMPLETED | OUTPATIENT
Start: 2023-09-12 | End: 2023-09-12

## 2023-09-12 RX ORDER — DIAZEPAM 5 MG
5 TABLET ORAL
Status: COMPLETED | OUTPATIENT
Start: 2023-09-12 | End: 2023-09-12

## 2023-09-12 RX ADMIN — SODIUM CHLORIDE 1000 MG: 9 INJECTION, SOLUTION INTRAVENOUS at 17:35

## 2023-09-12 RX ADMIN — ACETAMINOPHEN 650 MG: 325 TABLET, FILM COATED ORAL at 13:45

## 2023-09-12 RX ADMIN — ACETAMINOPHEN 650 MG: 325 TABLET, FILM COATED ORAL at 07:07

## 2023-09-12 RX ADMIN — LANSOPRAZOLE 30 MG: 30 CAPSULE, DELAYED RELEASE ORAL at 06:58

## 2023-09-12 RX ADMIN — GADOBUTROL 9 ML: 604.72 INJECTION INTRAVENOUS at 14:22

## 2023-09-12 RX ADMIN — DIAZEPAM 5 MG: 5 TABLET ORAL at 13:45

## 2023-09-12 ASSESSMENT — ACTIVITIES OF DAILY LIVING (ADL)
ADLS_ACUITY_SCORE: 20

## 2023-09-12 NOTE — PLAN OF CARE
Major Shift Events  pt is a/o pt states she is much improved from dizziness and blurred vision.  Walked in halls with  assist.  HA given Ty X2 with good results.  Solumedrol given IV.  Mag and K protocol wnl recheck in am.  Treatment Plan: Solumedrol given in ER. Neurology consult. Symptom management.     Bedside Nurse:Georgiana Madrigal RN

## 2023-09-12 NOTE — PROGRESS NOTES
To Do:  End of Shift Summary  For vital signs and complete assessments, please see documentation flowsheets.     Pertinent assessments: Pt A&O x4. VS stable.Tylenol given for HA.Patient denies any vision changes or numbness/tingling in extremities. Valium given prior to MRI at 1345. Ambulation: independent. Tolerates regular diet. Patient stated she feels much better today compared to yesterday. SoluMedrol IV at 1735.   Major Shift Events: MRI at 1400.   Treatment Plan: Discharge TBD. Neurology following. IV SoluMedrol x5 doses.     Bedside Nurse: Vilma Harden RN

## 2023-09-12 NOTE — PLAN OF CARE
Goal Outcome Evaluation:    Pertinent assessments: Assumed cares [id number]. Pt A&Ox4. Denies pain, except mild headache. Saying her symptoms improved and now are very mild, like numbness of the left side of her face, dizziness, and unsteady gait. Deneis SOB. Vitals stable on RA. Independent in the room. Awaiting an MRI; filled out the checklist.

## 2023-09-12 NOTE — PROGRESS NOTES
Agree with the history and physical as described by Student Doctor Ashia Marin.      Today's Plan:  Appreciate Neurology recommendations.  MRI cervical and thoracic today.  RF negative.  Lyme negative.  CRISTINA pending.  Planning for for 5 days of IV solumedrol.  Monitor BS given the high dose steroids.  Holding off on ISS for now.     ASSESSMENT/PLAN:  Diplopia  Facial Paresthesias  Suspected Multiple Sclerosis  - Appreciate Neurology recommendations  - MRI brain and MRA head and neck showed T2 shine within the left middle cerebral peduncle and questionable enhancement concerning for underlying demyelinating disease with active inflammation, scattered T2 signal abnormality elsewhere, irregularity vertebral arteries on postcontrast imaging favored to be artifactual  - IV solumedrol 1000 mg daily for 5 days  - Recommend outpatient Neurology follow up  - RF negative, Lyme negative, CRISTINA pending     Hyperglycemia  - A1C = 6.0  - Suspect pt will have some steroid induced hyperglycemia  - Monitor for now, but may need ISS if BS continue to be elevated     GERD  - Resume PTA lansoprazole    -------------------------------------------------------------------------    [facility]    Progress Note - Hospitalist Service       Date of Admission:  9/10/2023    Assessment & Plan   Bernardo Burgos is a 42 year old female admitted on 9/10/2023. She has no significant past medical history and is admitted for dizziness, diplopia, facial paresthesias -- likely new diagnosis of MS.     Diplopia  Facial Paresthesias  Suspected Multiple Sclerosis  - Neurology consult recommends:  MRI Cervical and Thoracic spine with and without yong  Lyme's titre, ACE level, ESR, CRISTINA, Rheumatoid factor    - MRI brain and MRA head and neck showed T2 shine within the left middle cerebral peduncle and questionable enhancement concerning for underlying demyelinating disease with active inflammation, scattered T2 signal abnormality  "elsewhere, irregularity vertebral arteries on postcontrast imaging favored to be artifactual   - Continue IV solumedrol 1000 mg daily for 5 days  - Recommend outpatient Neurology follow up     Hyperglycemia  - A1C = 6.0  - Suspect pt will have some steroid induced hyperglycemia  - Monitor for now, but may need ISS if BS continue to be elevated     GERD  - Continue PTA lansoprazole       Diet: Combination Diet Regular Diet Adult    DVT Prophylaxis: Low Risk/Ambulatory with no VTE prophylaxis indicated  Aguilar Catheter: Not present  Fluids: None  Lines: None     Cardiac Monitoring: None  Code Status: Full Code      Clinically Significant Risk Factors                         # Obesity: Estimated body mass index is 32.73 kg/m  as calculated from the following:    Height as of this encounter: 1.702 m (5' 7\").    Weight as of this encounter: 94.8 kg (208 lb 15.9 oz)., PRESENT ON ADMISSION            Disposition Plan     Expected Discharge Date: 09/13/2023                The patient's care was discussed with the Attending Physician, Dr. Lake .    HARMONY PHILIPPE, MS3  Medical Student  Hospitalist Service  [facility]  Securely message with [employer] (more info)  Text page via [facility] Paging/Directory   ______________________________________________________________________    Interval History   Pertinent overnight events : No acute events overnight. She noticed some facial flushing last night, no fevers or sweating. Mild headaches continue to occur, relieved by Tylenol. Diplopia is much improved, vision almost back to normal. Dizziness and facial paresthesias improved though still noticeable. Eating and drinking well, notices she is more thirsty than usual. Nausea is better, likely due to resolving dizziness and she says food helps settle her stomach.     Physical Exam   Vital Signs: Temp: 98.1  F (36.7  C) Temp src: Oral BP: (!) 140/62 Pulse: 89   Resp: 20 SpO2: 94 % O2 Device: None (Room air)    Weight: " 208 lbs 15.94 oz    Constitutional: awake, alert, cooperative, no apparent distress, and appears stated age  Respiratory: No increased work of breathing, good air exchange, clear to auscultation bilaterally, no crackles or wheezing  Cardiovascular: Normal apical impulse, regular rate and rhythm, normal S1 and S2, no S3 or S4, and no murmur noted  GI: No scars, normal bowel sounds, soft, non-distended, non-tender, no masses palpated, no hepatosplenomegally  Neurologic: Awake, alert, oriented to name, place and time.  Cranial nerves III-XII are grossly intact, some facial numbness/tingling in CN V3 distribution  Neuropsychiatric: General: normal, calm, and normal eye contact  Level of consciousness: alert / normal  Affect: normal and pleasant  Orientation: oriented to self, place, time and situation    Medical Decision Making       Please see A&P for additional details of medical decision making.      Data     I have personally reviewed the following data over the past 24 hrs:    19.1 (H)  \   12.9   / 422     140 105 19.1 /  158 (H)   5.3 22 0.76 \     TSH: N/A T4: N/A A1C: N/A       Imaging results reviewed over the past 24 hrs:   No results found for this or any previous visit (from the past 24 hour(s)).

## 2023-09-13 LAB
GLUCOSE BLDC GLUCOMTR-MCNC: 102 MG/DL (ref 70–99)
GLUCOSE BLDC GLUCOMTR-MCNC: 107 MG/DL (ref 70–99)
GLUCOSE BLDC GLUCOMTR-MCNC: 178 MG/DL (ref 70–99)
HOLD SPECIMEN: NORMAL
MAGNESIUM SERPL-MCNC: 1.9 MG/DL (ref 1.7–2.3)
POTASSIUM SERPL-SCNC: 4.3 MMOL/L (ref 3.4–5.3)

## 2023-09-13 PROCEDURE — 250N000011 HC RX IP 250 OP 636: Performed by: INTERNAL MEDICINE

## 2023-09-13 PROCEDURE — 258N000003 HC RX IP 258 OP 636: Performed by: INTERNAL MEDICINE

## 2023-09-13 PROCEDURE — 250N000013 HC RX MED GY IP 250 OP 250 PS 637: Performed by: INTERNAL MEDICINE

## 2023-09-13 PROCEDURE — 99232 SBSQ HOSP IP/OBS MODERATE 35: CPT | Performed by: INTERNAL MEDICINE

## 2023-09-13 PROCEDURE — 120N000001 HC R&B MED SURG/OB

## 2023-09-13 PROCEDURE — 36415 COLL VENOUS BLD VENIPUNCTURE: CPT | Performed by: INTERNAL MEDICINE

## 2023-09-13 PROCEDURE — 84132 ASSAY OF SERUM POTASSIUM: CPT | Performed by: INTERNAL MEDICINE

## 2023-09-13 PROCEDURE — 83735 ASSAY OF MAGNESIUM: CPT | Performed by: INTERNAL MEDICINE

## 2023-09-13 RX ORDER — NICOTINE POLACRILEX 4 MG
15-30 LOZENGE BUCCAL
Status: DISCONTINUED | OUTPATIENT
Start: 2023-09-13 | End: 2023-09-15 | Stop reason: HOSPADM

## 2023-09-13 RX ORDER — DEXTROSE MONOHYDRATE 25 G/50ML
25-50 INJECTION, SOLUTION INTRAVENOUS
Status: DISCONTINUED | OUTPATIENT
Start: 2023-09-13 | End: 2023-09-15 | Stop reason: HOSPADM

## 2023-09-13 RX ADMIN — ACETAMINOPHEN 650 MG: 325 TABLET, FILM COATED ORAL at 22:04

## 2023-09-13 RX ADMIN — ACETAMINOPHEN 650 MG: 325 TABLET, FILM COATED ORAL at 17:46

## 2023-09-13 RX ADMIN — LANSOPRAZOLE 30 MG: 30 CAPSULE, DELAYED RELEASE ORAL at 06:16

## 2023-09-13 RX ADMIN — ACETAMINOPHEN 650 MG: 325 TABLET, FILM COATED ORAL at 08:14

## 2023-09-13 RX ADMIN — SODIUM CHLORIDE 1000 MG: 9 INJECTION, SOLUTION INTRAVENOUS at 17:46

## 2023-09-13 RX ADMIN — ACETAMINOPHEN 650 MG: 325 TABLET, FILM COATED ORAL at 12:28

## 2023-09-13 ASSESSMENT — ACTIVITIES OF DAILY LIVING (ADL)
ADLS_ACUITY_SCORE: 20

## 2023-09-13 NOTE — PLAN OF CARE
End of Shift Summary  For vital signs and complete assessments, please see documentation flowsheets.     Pertinent assessments: VSS. Afebrile. LS clear. BS x4. Pt denies pain and nausea. Regular diet. Independently ambulating. PIV - SL. Pt continues to report slight numbness in (L) cheek, receeding per patient report. A&O, calls appropriately for assistance. Slept well.    Major Shift Events: none     Treatment Plan: Solumedrol, Encourage activity, Hopeful discharge home on Friday.

## 2023-09-13 NOTE — PROGRESS NOTES
Patient had MRI of cervical and thoracic spine showing demyelinating lesions in upper cervical cord and thoracic cord.  She has clinical RRMS.  Continue IV Solumedrol.  Follow up in clinic for disease modifying agents.

## 2023-09-13 NOTE — PROGRESS NOTES
"[facility]    Medicine Progress Note - Hospitalist Service    Date of Admission:  9/10/2023    Assessment & Plan   Bernardo Burgos is a 42 year old female admitted on 9/10/2023. She has no significant past medical history and is admitted for dizziness, diplopia, facial paresthesias -- likely new diagnosis of MS.     Diplopia  Facial Paresthesias  RRMS      -Continue current inpatient care  -Appreciate input from neurology service  -Continued on IV corticosteroids receiving Solu-Medrol 1 g daily for 5 days  -Plans for disease modifying therapy to be determined upon follow-up as outpatient    - MRI brain and MRA head and neck showed T2 shine within the left middle cerebral peduncle and questionable enhancement concerning for underlying demyelinating disease with active inflammation, scattered T2 signal abnormality elsewhere, irregularity vertebral arteries on postcontrast imaging favored to be artifactual   -MRI of the cervical and thoracic spine showing demyelinating lesions in upper cervical cord and thoracic cord    Hyperglycemia likely induced by ongoing steroid use  - A1C = 6.0  -Glucose checks and low intensity insulin sliding scale     GERD  - Continue PTA lansoprazole       Diet: Combination Diet Regular Diet Adult    DVT Prophylaxis: Ambulate every shift  Aguilar Catheter: Not present  Lines: None     Cardiac Monitoring: None  Code Status: Full Code      Clinically Significant Risk Factors                         # Obesity: Estimated body mass index is 32.73 kg/m  as calculated from the following:    Height as of this encounter: 1.702 m (5' 7\").    Weight as of this encounter: 94.8 kg (208 lb 15.9 oz)., PRESENT ON ADMISSION            Disposition Plan     Expected Discharge Date: Anticipating until she finished her corticosteroids with plans for hospital discharge this coming Friday                  Sammy Monzon MD, MD  Hospitalist Service  [facility]  Securely message with [employer] (more info)  Text page via [facility] Paging/Directory   ______________________________________________________________________    Interval History   I assume medicine service care today.  Seen and examined.  Chart reviewed.  Case discussed with nursing service.  I met this a very pleasant young lady while she is sleeping but easily aroused with minimal verbal stimuli.  She thinks she is feeling much better.  She denies any focal neurological signs.  No mental status changes.  Denies any nausea, vomiting or abdominal pain.  She is ambulatory with no assistance.  She is not requiring any oxygen support.  Currently afebrile.    Physical Exam   Vital Signs: Temp: 97.9  F (36.6  C) Temp src: Oral BP: (!) 145/71 Pulse: 82   Resp: 18 SpO2: 95 % O2 Device: None (Room air)    Weight: 208 lbs 15.94 oz    HEENT; Atraumatic, normocephalic, pinkish conjuctiva, pupils bilateral reactive   Skin: warm and moist, no rashes  Lymphatics: no cervical or axillary lymphandenopathy  Lungs: equal chest expansion, clear to auscultation, no wheezes, no stridor, no crackles,   Heart: normal rate, normal rhythm, no rubs or gallops.   Abdomen: normal bowel sounds, no tenderness, no peritoneal signs, no guarding  Extremities: no deformities, no edema   Neuro; follow commands, alert and oriented x3, spontaneous speech, coherent, moves all extremities spontaneously  Psych; no hallucination, euthymic mood, not agitated      Medical Decision Making       35 MINUTES SPENT BY ME on the date of service doing chart review, history, exam, documentation & further activities per the note.  MANAGEMENT DISCUSSED with the following over the past 24 hours: Yes   NOTE(S)/MEDICAL RECORDS REVIEWED over the past 24 hours: Yes       Data     I have personally reviewed the following data over the past 24 hrs:    N/A  \   N/A   / N/A     N/A N/A N/A /  N/A   4.3 N/A N/A \       Imaging results reviewed over the past 24 hrs:   Recent  Results (from the past 24 hour(s))   MR Cervical Spine w/o & w Contrast    Narrative    MR CERVICAL SPINE WITHOUT AND WITH CONTRAST  9/12/2023 3:23 PM    INDICATION: Diplopia. Facial paresthesias. Numbness. Dizziness.  Blurred vision. Gait imbalance. Abnormal brain MRI. Multiple  sclerosis.    TECHNIQUE: Noncontrast and contrast-enhanced MRI images of the  cervical spine.  CONTRAST:  9 mL Gadavist    COMPARISON: Brain MRI 9/10/2023    FINDINGS: T2 hyperintense peripheral enhancing lesion is visible  within the left middle cerebellar peduncle as noted on the comparison  brain MRI. Nonenhancing hyperintensity in the left lateral cord at the  C2 level. No abnormal enhancement. Otherwise normal cervical spinal  cord. Normal cervical vertebral body height and alignment. Benign  marrow, no edema. Mild left-sided uncovertebral joint degeneration at  C5-C6 causes mild foraminal narrowing. Similar less pronounced changes  at C6-C7. No neural impingement.      Impression    IMPRESSION:  1.  Nonenhancing hyperintensity within the left lateral spinal cord at  the C2 level presumably represents a focus of chronic demyelination  given the findings in the brain.  2.  Otherwise normal cervical spinal cord.  3.  Minor degenerative changes without stenosis.    OZZY HARVEY MD         SYSTEM ID:  [id number]   MR Thoracic Spine w/o & w Contrast    Narrative    MR THORACIC SPINE WITHOUT AND WITH CONTRAST  9/12/2023 3:23 PM    INDICATION: Diplopia. Facial paresthesias. Numbness. Dizziness.  Blurred vision. Gait imbalance. Abnormal brain MRI. Multiple  sclerosis.    TECHNIQUE: Noncontrast and contrast-enhanced MRI images of the  thoracic spine.  CONTRAST:  9 mL Gadavist    COMPARISON: Cervical spine MRI 9/12/2023, brain MRI 9/10/2023.    FINDINGS: A few hyperintensities within the mid thoracic spinal cord  on the sagittal STIR sequence (image 10) between T6 and T9 are not  corroborated on other sequences. No abnormal thoracic spinal  cord  enhancement. Thoracic vertebral bodies demonstrate normal height and  alignment. Minor disc degeneration in the mid thoracic spine without  herniation. Mild facet arthropathy T5-T6. No significant stenosis.      Impression    IMPRESSION:  1.  No evidence for active demyelination within the thoracic spinal  cord.  2.  Hyperintensities in the midthoracic spinal cord on a single  sagittal STIR image between T6 and T9 are not corroborated on other  sequences. These are indeterminate for artifact versus small chronic  demyelinating plaques given the findings on the brain and cervical  spine MRI.  3.  No significant degenerative change.    OZZY HARVYE MD         SYSTEM ID:  [id number]

## 2023-09-13 NOTE — PLAN OF CARE
To Do:  End of Shift Summary  For vital signs and complete assessments, please see documentation flowsheets.     Pertinent assessments: Assumed care [id number]. A&Ox4. Up ad cha when OOB. On RA. On a regular diet. PIV in R arm is saline locked. Patient denies any vision changes or numbness/tingling in extremities. Reports some lingering numbness in the L side of her face. Denies any pain/discomfort.     Major Shift Events: None   Treatment Plan: IV SoluMedrol x5 doses (on day 2 today)    Bedside Nurse: Gaye Wolf RN on 09/12/2023 at 10:51 PM

## 2023-09-14 LAB
ANA SER QL IF: NEGATIVE
GLUCOSE BLDC GLUCOMTR-MCNC: 111 MG/DL (ref 70–99)
GLUCOSE BLDC GLUCOMTR-MCNC: 119 MG/DL (ref 70–99)
GLUCOSE BLDC GLUCOMTR-MCNC: 225 MG/DL (ref 70–99)
GLUCOSE BLDC GLUCOMTR-MCNC: 231 MG/DL (ref 70–99)
MAGNESIUM SERPL-MCNC: 2.1 MG/DL (ref 1.7–2.3)
POTASSIUM SERPL-SCNC: 4.2 MMOL/L (ref 3.4–5.3)

## 2023-09-14 PROCEDURE — 84132 ASSAY OF SERUM POTASSIUM: CPT | Performed by: INTERNAL MEDICINE

## 2023-09-14 PROCEDURE — 250N000013 HC RX MED GY IP 250 OP 250 PS 637: Performed by: INTERNAL MEDICINE

## 2023-09-14 PROCEDURE — 120N000001 HC R&B MED SURG/OB

## 2023-09-14 PROCEDURE — 83735 ASSAY OF MAGNESIUM: CPT | Performed by: INTERNAL MEDICINE

## 2023-09-14 PROCEDURE — 250N000012 HC RX MED GY IP 250 OP 636 PS 637: Performed by: INTERNAL MEDICINE

## 2023-09-14 PROCEDURE — 36415 COLL VENOUS BLD VENIPUNCTURE: CPT | Performed by: INTERNAL MEDICINE

## 2023-09-14 PROCEDURE — 99232 SBSQ HOSP IP/OBS MODERATE 35: CPT | Performed by: INTERNAL MEDICINE

## 2023-09-14 PROCEDURE — 258N000003 HC RX IP 258 OP 636: Performed by: INTERNAL MEDICINE

## 2023-09-14 PROCEDURE — 250N000011 HC RX IP 250 OP 636: Performed by: INTERNAL MEDICINE

## 2023-09-14 RX ORDER — IBUPROFEN 200 MG
400 TABLET ORAL EVERY 6 HOURS PRN
Status: DISCONTINUED | OUTPATIENT
Start: 2023-09-14 | End: 2023-09-15 | Stop reason: HOSPADM

## 2023-09-14 RX ORDER — KETOROLAC TROMETHAMINE 15 MG/ML
15 INJECTION, SOLUTION INTRAMUSCULAR; INTRAVENOUS EVERY 6 HOURS PRN
Status: DISCONTINUED | OUTPATIENT
Start: 2023-09-14 | End: 2023-09-15 | Stop reason: HOSPADM

## 2023-09-14 RX ADMIN — IBUPROFEN 400 MG: 200 TABLET, FILM COATED ORAL at 14:22

## 2023-09-14 RX ADMIN — ACETAMINOPHEN 650 MG: 325 TABLET, FILM COATED ORAL at 06:24

## 2023-09-14 RX ADMIN — INSULIN GLARGINE 5 UNITS: 100 INJECTION, SOLUTION SUBCUTANEOUS at 13:44

## 2023-09-14 RX ADMIN — ACETAMINOPHEN 650 MG: 325 TABLET, FILM COATED ORAL at 17:48

## 2023-09-14 RX ADMIN — ACETAMINOPHEN 650 MG: 325 TABLET, FILM COATED ORAL at 21:44

## 2023-09-14 RX ADMIN — LANSOPRAZOLE 30 MG: 30 CAPSULE, DELAYED RELEASE ORAL at 06:21

## 2023-09-14 RX ADMIN — SODIUM CHLORIDE 1000 MG: 9 INJECTION, SOLUTION INTRAVENOUS at 17:43

## 2023-09-14 RX ADMIN — INSULIN ASPART 1 UNITS: 100 INJECTION, SOLUTION INTRAVENOUS; SUBCUTANEOUS at 08:23

## 2023-09-14 ASSESSMENT — ACTIVITIES OF DAILY LIVING (ADL)
ADLS_ACUITY_SCORE: 20

## 2023-09-14 NOTE — PROGRESS NOTES
[facility]    Medicine Progress Note - Hospitalist Service    Date of Admission:  9/10/2023    Assessment & Plan   Bernardo Burgos is a 42 year old female admitted on 9/10/2023. She has no significant past medical history and is admitted for dizziness, diplopia, facial paresthesias -- likely new diagnosis of MS.     Diplopia  Facial Paresthesias  RRMS      -Continue current inpatient care  -Appreciate input from neurology service  -Continued on IV corticosteroids receiving Solu-Medrol 1 g daily for 5 days  -Plans for disease modifying therapy to be determined upon follow-up as outpatient  -Will await further input from neurology service if patient will still be needing corticosteroids after she is done with his IV steroids in the hospital as outpatient.    - MRI brain and MRA head and neck showed T2 shine within the left middle cerebral peduncle and questionable enhancement concerning for underlying demyelinating disease with active inflammation, scattered T2 signal abnormality elsewhere, irregularity vertebral arteries on postcontrast imaging favored to be artifactual   -MRI of the cervical and thoracic spine showing demyelinating lesions in upper cervical cord and thoracic cord    Hyperglycemia likely induced by ongoing steroid use  - A1C = 6.0  -As anticipated patient is demonstrating worsening hyperglycemia  -We will provide coverage of Lantus added 5 units one-time dose now and continuation of insulin sliding scale     GERD  - Continue PTA lansoprazole    Headache  -Not getting much relief now from APAP.  We will provide some IV Toradol and as needed ibuprofen if with persistent pain     Diet: Combination Diet Regular Diet Adult    DVT Prophylaxis: Ambulate every shift  Aguilar Catheter: Not present  Lines: None     Cardiac Monitoring: None  Code Status: Full Code      Clinically Significant Risk Factors                         # Obesity: Estimated body mass index is 32.73 kg/m  as  "calculated from the following:    Height as of this encounter: 1.702 m (5' 7\").    Weight as of this encounter: 94.8 kg (208 lb 15.9 oz)., PRESENT ON ADMISSION            Disposition Plan     Expected Discharge Date: Anticipating until she finished her corticosteroids with plans for hospital discharge this coming Friday                  Sammy Monzon MD, MD  Hospitalist Service  [facility]  Securely message with [facility] (more info)  Text page via [facility] Paging/Directory   ______________________________________________________________________    Interval History   Continuing medicine service care today.  Seen and examined.  Chart reviewed.  Case discussed with nursing service.  I met this a very pleasant young lady while she is laying comfortably in bed.  She remains very much conversational, pleasant and endorses no complaints of nausea, vomiting.  She has been having some nagging headaches and intermittent blurry vision but denies any other focal weakness, numbness or tingling sensation.  Able to tolerate oral diet.  Passing flatus.  Voiding freely.  Ambulatory with no assistance.        Physical Exam   Vital Signs: Temp: 98.2  F (36.8  C) Temp src: Oral BP: 133/75 Pulse: 73   Resp: 18 SpO2: 94 % O2 Device: None (Room air)    Weight: 208 lbs 15.94 oz    HEENT; Atraumatic, normocephalic, pinkish conjuctiva, pupils bilateral reactive   Skin: warm and moist, no rashes  Lymphatics: no cervical or axillary lymphandenopathy  Lungs: equal chest expansion, clear to auscultation, no wheezes, no stridor, no crackles,   Heart: normal rate, normal rhythm, no rubs or gallops.   Abdomen: normal bowel sounds, no tenderness, no peritoneal signs, no guarding  Extremities: no deformities, no edema   Neuro; follow commands, alert and oriented x3, spontaneous speech, coherent, moves all extremities spontaneously  Psych; no hallucination, euthymic mood, not agitated      Medical Decision Making       35 " MINUTES SPENT BY ME on the date of service doing chart review, history, exam, documentation & further activities per the note.  MANAGEMENT DISCUSSED with the following over the past 24 hours: Yes   NOTE(S)/MEDICAL RECORDS REVIEWED over the past 24 hours: Yes       Data     I have personally reviewed the following data over the past 24 hrs:    N/A  \   N/A   / N/A     N/A N/A N/A /  225 (H)   4.2 N/A N/A \     Imaging results reviewed over the past 24 hrs:   No results found for this or any previous visit (from the past 24 hour(s)).

## 2023-09-14 NOTE — PLAN OF CARE
19:00-23:00    Complained of headache, PRN Tylenol given and ice pack applied with some relief per patient. Some left facial and tip of the tongue numbness but getting better reported. Independent in the room.

## 2023-09-14 NOTE — PLAN OF CARE
Goal Outcome Evaluation:         End of Shift Summary  For vital signs and complete assessments, please see documentation flowsheets.     Pertinent assessments: VS stable, A and O x 4, HA pain managed with  x 1 ibuprofen, ice  and tylenol, regular diet, IND, LS clear and no SOB, BS 3x/ day    Major Shift Events None    Treatment Plan: Potential discharge tomorrow, manage pain, K and Mag protocols

## 2023-09-14 NOTE — PLAN OF CARE
End of Shift Summary  For vital signs and complete assessments, please see documentation flowsheets.     Pertinent assessments: VSS. Afebrile. LS clear. BS x4. Pt reporting a headache. Denies nausea. Regular diet. Independently ambulating. PIV - SL. (L) facial numbness dissipating. A&O, calls appropriately for assistance. Slept well.    Major Shift Events: none     Treatment Plan: IV Solumedrol, Encourage activity, Hopeful discharge on Friday.

## 2023-09-14 NOTE — PLAN OF CARE
Goal Outcome Evaluation:       End of Shift Summary  For vital signs and complete assessments, please see documentation flowsheets.     Pertinent assessments: Pt A&OX4. Vss and on RA. Ls clear and no sob noted. Tolerated diet and no nausea noted. Had a headache and tylenol given X3 with relief.  Indep in the room. Pt c/o of some mild left facial numbness and face flushed. ,102.    Major Shift Events :None    Treatment Plan: IV Solumedrol, BG checks, plan for discharge on  Friday.

## 2023-09-15 VITALS
SYSTOLIC BLOOD PRESSURE: 154 MMHG | RESPIRATION RATE: 18 BRPM | HEIGHT: 67 IN | TEMPERATURE: 97.7 F | HEART RATE: 69 BPM | BODY MASS INDEX: 32.8 KG/M2 | WEIGHT: 209 LBS | DIASTOLIC BLOOD PRESSURE: 90 MMHG | OXYGEN SATURATION: 93 %

## 2023-09-15 LAB
GLUCOSE BLDC GLUCOMTR-MCNC: 128 MG/DL (ref 70–99)
GLUCOSE BLDC GLUCOMTR-MCNC: 157 MG/DL (ref 70–99)
MAGNESIUM SERPL-MCNC: 2.5 MG/DL (ref 1.7–2.3)
POTASSIUM SERPL-SCNC: 4.4 MMOL/L (ref 3.4–5.3)

## 2023-09-15 PROCEDURE — 83735 ASSAY OF MAGNESIUM: CPT | Performed by: INTERNAL MEDICINE

## 2023-09-15 PROCEDURE — 84132 ASSAY OF SERUM POTASSIUM: CPT | Performed by: INTERNAL MEDICINE

## 2023-09-15 PROCEDURE — 99239 HOSP IP/OBS DSCHRG MGMT >30: CPT | Performed by: INTERNAL MEDICINE

## 2023-09-15 PROCEDURE — 250N000013 HC RX MED GY IP 250 OP 250 PS 637: Performed by: INTERNAL MEDICINE

## 2023-09-15 PROCEDURE — 36415 COLL VENOUS BLD VENIPUNCTURE: CPT | Performed by: INTERNAL MEDICINE

## 2023-09-15 RX ORDER — ACETAMINOPHEN 325 MG/1
650 TABLET ORAL EVERY 6 HOURS PRN
Qty: 60 TABLET | Refills: 0 | Status: SHIPPED | OUTPATIENT
Start: 2023-09-15

## 2023-09-15 RX ORDER — METHYLPREDNISOLONE 4 MG
TABLET, DOSE PACK ORAL
Qty: 21 TABLET | Refills: 0 | Status: SHIPPED | OUTPATIENT
Start: 2023-09-15

## 2023-09-15 RX ADMIN — INSULIN ASPART 1 UNITS: 100 INJECTION, SOLUTION INTRAVENOUS; SUBCUTANEOUS at 07:11

## 2023-09-15 RX ADMIN — LANSOPRAZOLE 30 MG: 30 CAPSULE, DELAYED RELEASE ORAL at 06:31

## 2023-09-15 RX ADMIN — ACETAMINOPHEN 650 MG: 325 TABLET, FILM COATED ORAL at 09:50

## 2023-09-15 ASSESSMENT — ACTIVITIES OF DAILY LIVING (ADL)
ADLS_ACUITY_SCORE: 20
ADLS_ACUITY_SCORE: 22
ADLS_ACUITY_SCORE: 20
ADLS_ACUITY_SCORE: 22
ADLS_ACUITY_SCORE: 20

## 2023-09-15 NOTE — PLAN OF CARE
Goal Outcome Evaluation:       End of Shift Summary  For vital signs and complete assessments, please see documentation flowsheets.     Pertinent assessments: A&OX4, VSS, LS clear, BS are audible, on RA, on regular diet. Left side facial numbness present. Complained of headache, PRN tylenol given, ice pack applied. Independent when ambulating.    Treatment Plan: Potential discharge today.

## 2023-09-15 NOTE — DISCHARGE SUMMARY
"[facility]  Hospitalist Discharge Summary      Date of Admission:  9/10/2023  Date of Discharge:  9/15/2023  Discharging Provider: Sammy Monzon MD, MD  Discharge Service: Hospitalist Service    Discharge Diagnoses   Diplopia  Facial paresthesias  Newly diagnosed RRMS  Steroid-induced hyperglycemia  History of GERD    Clinically Significant Risk Factors     # Obesity: Estimated body mass index is 32.73 kg/m  as calculated from the following:    Height as of this encounter: 1.702 m (5' 7\").    Weight as of this encounter: 94.8 kg (208 lb 15.9 oz).       Follow-ups Needed After Discharge   Follow-up Appointments     Follow-up and recommended labs and tests       Follow up with primary care provider, [facility],   within 7 days to evaluate medication change, to evaluate treatment change,   and for hospital follow- up.    Please follow-up with general neurology for your recent diagnosis of RRMS   and intentions to initiate disease modifying therapy after 1 to 2 weeks.            Unresulted Labs Ordered in the Past 30 Days of this Admission       No orders found from 8/11/2023 to 9/11/2023.            Discharge Disposition   Discharged to home  Condition at discharge: Stable    Hospital Course     Addendum: 12 noon  I was notified by nursing service that neurology provided more recommendations and input upon discharge with continuation of corticosteroids via Medrol Dosepak.  I discussed this with her patient and she will be getting this medications at her outside pharmacy.  Needs to follow-up closely with neurology as scheduled.    Continuing medicine service care.  Seen and examined.  Chart reviewed.  Case discussed with nursing service.  No significant reported issues overnight.  Hyperglycemia improved as anticipated with earlier additional dose of basal Lantus.  No worsening headaches or any complaints of cardiorespiratory symptomatology.  Tolerating oral diet.  No " mental status changes.  Will await further input from neurology perspective regarding further optimization upon discharge.  Plans for hospital discharge today as she finish corticosteroids administration here in the hospital.  Will need close follow-up with neurology service with anticipated initiation of disease modifying therapy for this recently diagnosed RRMS.      I will refer you to excerpts of prior progress notes as listed below for other details of her hospital stay:        Bernardo Burgos is a 42 year old female admitted on 9/10/2023. She has no significant past medical history and is admitted for dizziness, diplopia, facial paresthesias -- likely new diagnosis of MS.     Diplopia  Facial Paresthesias  RRMS      -Continue current inpatient care  -Appreciate input from neurology service  -Continued on IV corticosteroids receiving Solu-Medrol 1 g daily for 5 days  -Plans for disease modifying therapy to be determined upon follow-up as outpatient  -Will await further input from neurology service if patient will still be needing corticosteroids after she is done with his IV steroids in the hospital as outpatient.    - MRI brain and MRA head and neck showed T2 shine within the left middle cerebral peduncle and questionable enhancement concerning for underlying demyelinating disease with active inflammation, scattered T2 signal abnormality elsewhere, irregularity vertebral arteries on postcontrast imaging favored to be artifactual   -MRI of the cervical and thoracic spine showing demyelinating lesions in upper cervical cord and thoracic cord    Hyperglycemia likely induced by ongoing steroid use  - A1C = 6.0  -As anticipated patient is demonstrating worsening hyperglycemia  -We will provide coverage of Lantus added 5 units one-time dose now and continuation of insulin sliding scale     GERD  - Continue PTA lansoprazole    Headache  -Not getting much relief now from APAP.  We will provide some IV Toradol  and as needed ibuprofen if with persistent pain    Consultations This Hospital Stay   NEUROLOGY IP CONSULT    Code Status   Full Code    Time Spent on this Encounter   I, Sammy Monzon MD, MD, personally saw the patient today and spent greater than 30 minutes discharging this patient.       Sammy Monzon MD, MD  [address] SURGICAL  [address] [facility] [address]  Phone: [phone]  Fax: [phone]  ______________________________________________________________________    Physical Exam   Vital Signs: Temp: 97.7  F (36.5  C) Temp src: Oral BP: (!) 154/90 Pulse: 69   Resp: 18 SpO2: 93 % O2 Device: None (Room air)    Weight: 208 lbs 15.94 oz  HEENT; Atraumatic, normocephalic, pinkish conjuctiva, pupils bilateral reactive   Skin: warm and moist, no rashes  Lymphatics: no cervical or axillary lymphandenopathy  Lungs: equal chest expansion, clear to auscultation, no wheezes, no stridor, no crackles,   Heart: normal rate, normal rhythm, no rubs or gallops.   Abdomen: normal bowel sounds, no tenderness, no peritoneal signs, no guarding  Extremities: no deformities, no edema   Neuro; follow commands, alert and oriented x3, spontaneous speech, coherent, moves all extremities spontaneously  Psych; no hallucination, euthymic mood, not agitated         Primary Care Physician   [facility]    Discharge Orders      Reason for your hospital stay    This young pleasant 43-year-old lady with no prior medical history who was initially admitted the hospital due to multitude of symptomatology such as dizziness, diplopia, facial paresthesias and subsequently underwent extensive neurological work-up and found with RRMS.  She was treated with high dose corticosteroids.  Good improvement and response with her symptomatology.  Seen and being optimized from neurology perspective.  Plans for disease modifying therapy upon follow-up as outpatient.  She finished 5-day  course of IV corticosteroids at 1 g daily.  She had anticipated side effects such as hyperglycemia, flushed face.  I am anticipating her hyperglycemia will resolve and improve after discontinuation and finishing her corticosteroid treatment.  No further insulin upon discharge.  Will await further input and recommendations from general neurology regarding discharge planning if still needing further steroids upon discharge.     Follow-up and recommended labs and tests     Follow up with primary care provider, [facility], within 7 days to evaluate medication change, to evaluate treatment change, and for hospital follow- up.    Please follow-up with general neurology for your recent diagnosis of RRMS and intentions to initiate disease modifying therapy after 1 to 2 weeks.     Activity    Your activity upon discharge: activity as tolerated     Full Code     Diet    Follow this diet upon discharge: Orders Placed This Encounter      Combination Diet Regular Diet Adult       Significant Results and Procedures   Most Recent 3 CBC's:  Recent Labs   Lab Test 09/12/23  0730 09/11/23  0649 09/10/23  1153   WBC 19.1* 10.3 11.2*   HGB 12.9 13.3 13.4   MCV 81 82 83    446 396     Most Recent 3 BMP's:  Recent Labs   Lab Test 09/15/23  0725 09/15/23  0702 09/14/23  2128 09/14/23  1753 09/14/23  1207 09/14/23  0820 09/13/23  1206 09/13/23  0655 09/12/23  0730 09/11/23  0649 09/10/23  1153   NA  --   --   --   --   --   --   --   --  140 136 137   POTASSIUM 4.4  --   --   --   --  4.2  --  4.3 5.3 4.8 4.0   CHLORIDE  --   --   --   --   --   --   --   --  105 102 104   CO2  --   --   --   --   --   --   --   --  22 24 25   BUN  --   --   --   --   --   --   --   --  19.1 14.6 9.6   CR  --   --   --   --   --   --   --   --  0.76 0.76 0.82   ANIONGAP  --   --   --   --   --   --   --   --  13 10 8   FANY  --   --   --   --   --   --   --   --  9.4 10.1* 9.2   GLC  --  157* 231* 111*   < >  --    < >  --  158*  165* 116*    < > = values in this interval not displayed.     Most Recent 3 Troponin's:  Recent Labs   Lab Test 07/26/19  1219 07/26/19  1025   TROPI <0.015 <0.015     Most Recent Hemoglobin A1c:  Recent Labs   Lab Test 09/11/23  0649   A1C 6.0*     Most Recent 6 glucoses:  Recent Labs   Lab Test 09/15/23  0702 09/14/23  2128 09/14/23  1753 09/14/23  1207 09/14/23  0819 09/13/23  2300   * 231* 111* 119* 225* 178*   ,   Results for orders placed or performed during the hospital encounter of 09/10/23   MR Brain w/o & w Contrast    Narrative    EXAM: MRA BRAIN ([place]) W/O CONTRAST, MRA NECK (CAROTIDS) W/O and W CONTRAST, MR BRAIN W/O and W CONTRAST  LOCATION: [facility]  DATE: 9/10/2023    INDICATION: see above; Headache; Neurologic deficit, non traumatic; Sensory loss; Neurologic deficit onset > 24 hours; No known automatically detected potential contraindications to imaging  COMPARISON: None.  CONTRAST: 10 mL Gadavist  TECHNIQUE:   1) Routine multiplanar multisequence head MRI without and with intravenous contrast.  2) 3D time-of-flight head MRA without intravenous contrast.  3) Neck MRA without and with IV contrast. Stenosis measurements made according to NASCET criteria unless otherwise specified.    FINDINGS:  HEAD MRI:  INTRACRANIAL CONTENTS: There is a moderate focus of T2 shine through in the left middle cerebral peduncle. Post contrast imaging is significantly limited at this level due to pulsation artifact from the sigmoid sinus in both the coronal and sagittal   plane. Despite artifact, there appears to be some amount of subtle peripheral enhancement. Findings are suspicious for underlying demyelinating plaque with active information. There are two additional foci of periventricular T2/FLAIR signal abnormality   involving the left frontal white matter and posterior right temporal white matter which are suspicious for demyelinating plaques. These lesions show no  abnormal enhancement. The lesion in the left periventricular region shows increased diffusion signal,   likely early characterize T2 shine through. No mass, acute hemorrhage, or extra-axial fluid collections. Normal ventricles and sulci. Normal position of the cerebellar tonsils. No additional pathologic contrast enhancement.    SELLA: No abnormality accounting for technique.    OSSEOUS STRUCTURES/SOFT TISSUES: Normal marrow signal. The major intracranial vascular flow voids are maintained.     ORBITS: No abnormality accounting for technique.     SINUSES/MASTOIDS: No paranasal sinus mucosal disease. No middle ear or mastoid effusion.     HEAD MRA:   ANTERIOR CIRCULATION: No stenosis/occlusion, aneurysm, or high flow vascular malformation. Standard [place] anatomy.    POSTERIOR CIRCULATION: No stenosis/occlusion, aneurysm, or high flow vascular malformation. Balanced vertebral arteries supply a normal basilar artery.     NECK MRA:   RIGHT CAROTID: No measurable stenosis or dissection.    LEFT CAROTID: No measurable stenosis or dissection.    VERTEBRAL ARTERIES: Irregularity of the vertebral arteries on postcontrast imaging is favored to be artifactual. No vertebral artery irregularity and 2-D time-of-flight imaging. Balanced vertebral arteries.    AORTIC ARCH: Classic aortic arch anatomy with no significant stenosis at the origin of the great vessels.      Impression    IMPRESSION:  HEAD MRI:   1.  T2 shine through in the left middle cerebral peduncle and questionable enhancement is concerning for underlying demyelinating disease with active inflammation.  2.  Minimal scattered white matter T2/FLAIR signal abnormality elsewhere likely represents additional demyelinating plaques. These show no abnormal enhancement.    HEAD MRA:   1.  No aneurysm, high flow AVM or significant stenosis identified.    NECK MRA:  1.  No hemodynamically significant stenosis in the vessels of the neck.  2.  Irregularity vertebral  arteries on postcontrast imaging only is favored to be artifactual. Other etiologies such as fibromuscular dysplasia are felt to be less unlikely. A CT angiogram could confirm if clinically indicated.   MRA Angiogram Head w/o Contrast    Narrative    EXAM: MRA BRAIN ([place] OF JUNIOR) W/O CONTRAST, MRA NECK (CAROTIDS) W/O and W CONTRAST, MR BRAIN W/O and W CONTRAST  LOCATION: [facility]  DATE: 9/10/2023    INDICATION: see above; Headache; Neurologic deficit, non traumatic; Sensory loss; Neurologic deficit onset > 24 hours; No known automatically detected potential contraindications to imaging  COMPARISON: None.  CONTRAST: 10 mL Gadavist  TECHNIQUE:   1) Routine multiplanar multisequence head MRI without and with intravenous contrast.  2) 3D time-of-flight head MRA without intravenous contrast.  3) Neck MRA without and with IV contrast. Stenosis measurements made according to NASCET criteria unless otherwise specified.    FINDINGS:  HEAD MRI:  INTRACRANIAL CONTENTS: There is a moderate focus of T2 shine through in the left middle cerebral peduncle. Post contrast imaging is significantly limited at this level due to pulsation artifact from the sigmoid sinus in both the coronal and sagittal   plane. Despite artifact, there appears to be some amount of subtle peripheral enhancement. Findings are suspicious for underlying demyelinating plaque with active information. There are two additional foci of periventricular T2/FLAIR signal abnormality   involving the left frontal white matter and posterior right temporal white matter which are suspicious for demyelinating plaques. These lesions show no abnormal enhancement. The lesion in the left periventricular region shows increased diffusion signal,   likely early characterize T2 shine through. No mass, acute hemorrhage, or extra-axial fluid collections. Normal ventricles and sulci. Normal position of the cerebellar tonsils. No additional pathologic  contrast enhancement.    SELLA: No abnormality accounting for technique.    OSSEOUS STRUCTURES/SOFT TISSUES: Normal marrow signal. The major intracranial vascular flow voids are maintained.     ORBITS: No abnormality accounting for technique.     SINUSES/MASTOIDS: No paranasal sinus mucosal disease. No middle ear or mastoid effusion.     HEAD MRA:   ANTERIOR CIRCULATION: No stenosis/occlusion, aneurysm, or high flow vascular malformation. Standard [place] anatomy.    POSTERIOR CIRCULATION: No stenosis/occlusion, aneurysm, or high flow vascular malformation. Balanced vertebral arteries supply a normal basilar artery.     NECK MRA:   RIGHT CAROTID: No measurable stenosis or dissection.    LEFT CAROTID: No measurable stenosis or dissection.    VERTEBRAL ARTERIES: Irregularity of the vertebral arteries on postcontrast imaging is favored to be artifactual. No vertebral artery irregularity and 2-D time-of-flight imaging. Balanced vertebral arteries.    AORTIC ARCH: Classic aortic arch anatomy with no significant stenosis at the origin of the great vessels.      Impression    IMPRESSION:  HEAD MRI:   1.  T2 shine through in the left middle cerebral peduncle and questionable enhancement is concerning for underlying demyelinating disease with active inflammation.  2.  Minimal scattered white matter T2/FLAIR signal abnormality elsewhere likely represents additional demyelinating plaques. These show no abnormal enhancement.    HEAD MRA:   1.  No aneurysm, high flow AVM or significant stenosis identified.    NECK MRA:  1.  No hemodynamically significant stenosis in the vessels of the neck.  2.  Irregularity vertebral arteries on postcontrast imaging only is favored to be artifactual. Other etiologies such as fibromuscular dysplasia are felt to be less unlikely. A CT angiogram could confirm if clinically indicated.   MRA Angiogram Neck w/o & w Contrast    Narrative    EXAM: MRA BRAIN ([place]) W/O CONTRAST, MRA  NECK (CAROTIDS) W/O and W CONTRAST, MR BRAIN W/O and W CONTRAST  LOCATION: [facility]  DATE: 9/10/2023    INDICATION: see above; Headache; Neurologic deficit, non traumatic; Sensory loss; Neurologic deficit onset > 24 hours; No known automatically detected potential contraindications to imaging  COMPARISON: None.  CONTRAST: 10 mL Gadavist  TECHNIQUE:   1) Routine multiplanar multisequence head MRI without and with intravenous contrast.  2) 3D time-of-flight head MRA without intravenous contrast.  3) Neck MRA without and with IV contrast. Stenosis measurements made according to NASCET criteria unless otherwise specified.    FINDINGS:  HEAD MRI:  INTRACRANIAL CONTENTS: There is a moderate focus of T2 shine through in the left middle cerebral peduncle. Post contrast imaging is significantly limited at this level due to pulsation artifact from the sigmoid sinus in both the coronal and sagittal   plane. Despite artifact, there appears to be some amount of subtle peripheral enhancement. Findings are suspicious for underlying demyelinating plaque with active information. There are two additional foci of periventricular T2/FLAIR signal abnormality   involving the left frontal white matter and posterior right temporal white matter which are suspicious for demyelinating plaques. These lesions show no abnormal enhancement. The lesion in the left periventricular region shows increased diffusion signal,   likely early characterize T2 shine through. No mass, acute hemorrhage, or extra-axial fluid collections. Normal ventricles and sulci. Normal position of the cerebellar tonsils. No additional pathologic contrast enhancement.    SELLA: No abnormality accounting for technique.    OSSEOUS STRUCTURES/SOFT TISSUES: Normal marrow signal. The major intracranial vascular flow voids are maintained.     ORBITS: No abnormality accounting for technique.     SINUSES/MASTOIDS: No paranasal sinus mucosal disease. No  middle ear or mastoid effusion.     HEAD MRA:   ANTERIOR CIRCULATION: No stenosis/occlusion, aneurysm, or high flow vascular malformation. Standard [place] anatomy.    POSTERIOR CIRCULATION: No stenosis/occlusion, aneurysm, or high flow vascular malformation. Balanced vertebral arteries supply a normal basilar artery.     NECK MRA:   RIGHT CAROTID: No measurable stenosis or dissection.    LEFT CAROTID: No measurable stenosis or dissection.    VERTEBRAL ARTERIES: Irregularity of the vertebral arteries on postcontrast imaging is favored to be artifactual. No vertebral artery irregularity and 2-D time-of-flight imaging. Balanced vertebral arteries.    AORTIC ARCH: Classic aortic arch anatomy with no significant stenosis at the origin of the great vessels.      Impression    IMPRESSION:  HEAD MRI:   1.  T2 shine through in the left middle cerebral peduncle and questionable enhancement is concerning for underlying demyelinating disease with active inflammation.  2.  Minimal scattered white matter T2/FLAIR signal abnormality elsewhere likely represents additional demyelinating plaques. These show no abnormal enhancement.    HEAD MRA:   1.  No aneurysm, high flow AVM or significant stenosis identified.    NECK MRA:  1.  No hemodynamically significant stenosis in the vessels of the neck.  2.  Irregularity vertebral arteries on postcontrast imaging only is favored to be artifactual. Other etiologies such as fibromuscular dysplasia are felt to be less unlikely. A CT angiogram could confirm if clinically indicated.   MR Cervical Spine w/o & w Contrast    Narrative    MR CERVICAL SPINE WITHOUT AND WITH CONTRAST  9/12/2023 3:23 PM    INDICATION: Diplopia. Facial paresthesias. Numbness. Dizziness.  Blurred vision. Gait imbalance. Abnormal brain MRI. Multiple  sclerosis.    TECHNIQUE: Noncontrast and contrast-enhanced MRI images of the  cervical spine.  CONTRAST:  9 mL Gadavist    COMPARISON: Brain MRI  9/10/2023    FINDINGS: T2 hyperintense peripheral enhancing lesion is visible  within the left middle cerebellar peduncle as noted on the comparison  brain MRI. Nonenhancing hyperintensity in the left lateral cord at the  C2 level. No abnormal enhancement. Otherwise normal cervical spinal  cord. Normal cervical vertebral body height and alignment. Benign  marrow, no edema. Mild left-sided uncovertebral joint degeneration at  C5-C6 causes mild foraminal narrowing. Similar less pronounced changes  at C6-C7. No neural impingement.      Impression    IMPRESSION:  1.  Nonenhancing hyperintensity within the left lateral spinal cord at  the C2 level presumably represents a focus of chronic demyelination  given the findings in the brain.  2.  Otherwise normal cervical spinal cord.  3.  Minor degenerative changes without stenosis.    OZZY HARVEY MD         SYSTEM ID:  [id number]   MR Thoracic Spine w/o & w Contrast    Narrative    MR THORACIC SPINE WITHOUT AND WITH CONTRAST  9/12/2023 3:23 PM    INDICATION: Diplopia. Facial paresthesias. Numbness. Dizziness.  Blurred vision. Gait imbalance. Abnormal brain MRI. Multiple  sclerosis.    TECHNIQUE: Noncontrast and contrast-enhanced MRI images of the  thoracic spine.  CONTRAST:  9 mL Gadavist    COMPARISON: Cervical spine MRI 9/12/2023, brain MRI 9/10/2023.    FINDINGS: A few hyperintensities within the mid thoracic spinal cord  on the sagittal STIR sequence (image 10) between T6 and T9 are not  corroborated on other sequences. No abnormal thoracic spinal cord  enhancement. Thoracic vertebral bodies demonstrate normal height and  alignment. Minor disc degeneration in the mid thoracic spine without  herniation. Mild facet arthropathy T5-T6. No significant stenosis.      Impression    IMPRESSION:  1.  No evidence for active demyelination within the thoracic spinal  cord.  2.  Hyperintensities in the midthoracic spinal cord on a single  sagittal STIR image between T6 and T9  are not corroborated on other  sequences. These are indeterminate for artifact versus small chronic  demyelinating plaques given the findings on the brain and cervical  spine MRI.  3.  No significant degenerative change.    OZZY HARVEY MD         SYSTEM ID:  [id number]       Discharge Medications   Current Discharge Medication List        START taking these medications    Details   acetaminophen (TYLENOL) 325 MG tablet Take 2 tablets (650 mg) by mouth every 6 hours as needed for mild pain  Qty: 60 tablet, Refills: 0    Associated Diagnoses: Multiple sclerosis exacerbation (H)           CONTINUE these medications which have NOT CHANGED    Details   fluticasone (FLONASE) 50 MCG/ACT nasal spray Spray 1-2 sprays into both nostrils daily as needed      LANsoprazole (PREVACID) 30 MG DR capsule Take 30 mg by mouth every morning (before breakfast)           Allergies   No Known Allergies

## 2023-09-15 NOTE — PROGRESS NOTES
Patient discharged home with . Discharge instructions given and verbalized understanding. Discharge medications sent with patient and patients pharmacy.     Jennifer Davalos RN on 9/15/2023

## 2023-10-24 ENCOUNTER — ANCILLARY PROCEDURE (OUTPATIENT)
Dept: MAMMOGRAPHY | Facility: CLINIC | Age: 43
End: 2023-10-24
Payer: COMMERCIAL

## 2023-10-24 DIAGNOSIS — Z12.31 VISIT FOR SCREENING MAMMOGRAM: ICD-10-CM

## 2023-10-24 PROCEDURE — 77067 SCR MAMMO BI INCL CAD: CPT | Mod: TC | Performed by: RADIOLOGY

## 2024-01-26 ENCOUNTER — LAB REQUISITION (OUTPATIENT)
Dept: LAB | Facility: CLINIC | Age: 44
End: 2024-01-26
Payer: COMMERCIAL

## 2024-01-26 DIAGNOSIS — R73.9 HYPERGLYCEMIA, UNSPECIFIED: ICD-10-CM

## 2024-01-26 LAB — HBA1C MFR BLD: 6 %

## 2024-01-26 PROCEDURE — 83036 HEMOGLOBIN GLYCOSYLATED A1C: CPT | Mod: ORL | Performed by: OBSTETRICS & GYNECOLOGY

## 2024-02-25 ENCOUNTER — HEALTH MAINTENANCE LETTER (OUTPATIENT)
Age: 44
End: 2024-02-25

## 2024-07-22 ENCOUNTER — LAB (OUTPATIENT)
Dept: LAB | Facility: CLINIC | Age: 44
End: 2024-07-22
Payer: COMMERCIAL

## 2024-07-22 DIAGNOSIS — G35 MULTIPLE SCLEROSIS (H): Primary | ICD-10-CM

## 2024-07-22 LAB
ALBUMIN SERPL BCG-MCNC: 4.3 G/DL (ref 3.5–5.2)
ALP SERPL-CCNC: 103 U/L (ref 40–150)
ALT SERPL W P-5'-P-CCNC: 9 U/L (ref 0–50)
AST SERPL W P-5'-P-CCNC: 16 U/L (ref 0–45)
BASOPHILS # BLD AUTO: 0.1 10E3/UL (ref 0–0.2)
BASOPHILS NFR BLD AUTO: 1 %
BILIRUB DIRECT SERPL-MCNC: <0.2 MG/DL (ref 0–0.3)
BILIRUB SERPL-MCNC: 0.3 MG/DL
EOSINOPHIL # BLD AUTO: 0.2 10E3/UL (ref 0–0.7)
EOSINOPHIL NFR BLD AUTO: 2 %
ERYTHROCYTE [DISTWIDTH] IN BLOOD BY AUTOMATED COUNT: 14.5 % (ref 10–15)
HCT VFR BLD AUTO: 42.2 % (ref 35–47)
HGB BLD-MCNC: 13.1 G/DL (ref 11.7–15.7)
IMM GRANULOCYTES # BLD: 0 10E3/UL
IMM GRANULOCYTES NFR BLD: 0 %
LYMPHOCYTES # BLD AUTO: 2.1 10E3/UL (ref 0.8–5.3)
LYMPHOCYTES NFR BLD AUTO: 24 %
Lab: NORMAL
MCH RBC QN AUTO: 25.9 PG (ref 26.5–33)
MCHC RBC AUTO-ENTMCNC: 31 G/DL (ref 31.5–36.5)
MCV RBC AUTO: 84 FL (ref 78–100)
MONOCYTES # BLD AUTO: 0.6 10E3/UL (ref 0–1.3)
MONOCYTES NFR BLD AUTO: 7 %
NEUTROPHILS # BLD AUTO: 6 10E3/UL (ref 1.6–8.3)
NEUTROPHILS NFR BLD AUTO: 67 %
NRBC # BLD AUTO: 0 10E3/UL
NRBC BLD AUTO-RTO: 0 /100
PERFORMING LABORATORY: NORMAL
PLATELET # BLD AUTO: 404 10E3/UL (ref 150–450)
PROT SERPL-MCNC: 7.5 G/DL (ref 6.4–8.3)
RBC # BLD AUTO: 5.05 10E6/UL (ref 3.8–5.2)
SPECIMEN STATUS: NORMAL
TEST NAME: NORMAL
VIT D+METAB SERPL-MCNC: 48 NG/ML (ref 20–50)
WBC # BLD AUTO: 9 10E3/UL (ref 4–11)

## 2024-07-22 PROCEDURE — 82306 VITAMIN D 25 HYDROXY: CPT

## 2024-07-22 PROCEDURE — 86360 T CELL ABSOLUTE COUNT/RATIO: CPT

## 2024-07-22 PROCEDURE — 80076 HEPATIC FUNCTION PANEL: CPT

## 2024-07-22 PROCEDURE — 36415 COLL VENOUS BLD VENIPUNCTURE: CPT

## 2024-07-22 PROCEDURE — 82784 ASSAY IGA/IGD/IGG/IGM EACH: CPT

## 2024-07-22 PROCEDURE — 85004 AUTOMATED DIFF WBC COUNT: CPT

## 2024-07-22 PROCEDURE — 86359 T CELLS TOTAL COUNT: CPT

## 2024-07-22 PROCEDURE — 86355 B CELLS TOTAL COUNT: CPT

## 2024-07-23 LAB
IGA SERPL-MCNC: 124 MG/DL (ref 84–499)
IGG SERPL-MCNC: 954 MG/DL (ref 610–1616)
IGM SERPL-MCNC: 86 MG/DL (ref 35–242)

## 2024-07-25 LAB — LABCORP INTERFACED MISCELLANEOUS TEST RESULT: NORMAL

## 2025-03-15 ENCOUNTER — HEALTH MAINTENANCE LETTER (OUTPATIENT)
Age: 45
End: 2025-03-15

## 2025-04-08 ENCOUNTER — LAB (OUTPATIENT)
Dept: LAB | Facility: CLINIC | Age: 45
End: 2025-04-08
Payer: COMMERCIAL

## 2025-04-08 DIAGNOSIS — G35 MULTIPLE SCLEROSIS (H): ICD-10-CM

## 2025-04-08 LAB
ALBUMIN SERPL BCG-MCNC: 4.4 G/DL (ref 3.5–5.2)
ALP SERPL-CCNC: 118 U/L (ref 40–150)
ALT SERPL W P-5'-P-CCNC: 11 U/L (ref 0–50)
AST SERPL W P-5'-P-CCNC: 23 U/L (ref 0–45)
BASOPHILS # BLD AUTO: 0.1 10E3/UL (ref 0–0.2)
BASOPHILS NFR BLD AUTO: 1 %
BILIRUB DIRECT SERPL-MCNC: 0.1 MG/DL (ref 0–0.3)
BILIRUB SERPL-MCNC: 0.3 MG/DL
EOSINOPHIL # BLD AUTO: 0.1 10E3/UL (ref 0–0.7)
EOSINOPHIL NFR BLD AUTO: 1 %
ERYTHROCYTE [DISTWIDTH] IN BLOOD BY AUTOMATED COUNT: 15.8 % (ref 10–15)
HCT VFR BLD AUTO: 40.8 % (ref 35–47)
HGB BLD-MCNC: 13.2 G/DL (ref 11.7–15.7)
IMM GRANULOCYTES # BLD: 0 10E3/UL
IMM GRANULOCYTES NFR BLD: 0 %
LYMPHOCYTES # BLD AUTO: 1.6 10E3/UL (ref 0.8–5.3)
LYMPHOCYTES NFR BLD AUTO: 17 %
Lab: NORMAL
MCH RBC QN AUTO: 25.9 PG (ref 26.5–33)
MCHC RBC AUTO-ENTMCNC: 32.4 G/DL (ref 31.5–36.5)
MCV RBC AUTO: 80 FL (ref 78–100)
MONOCYTES # BLD AUTO: 0.6 10E3/UL (ref 0–1.3)
MONOCYTES NFR BLD AUTO: 6 %
NEUTROPHILS # BLD AUTO: 7.2 10E3/UL (ref 1.6–8.3)
NEUTROPHILS NFR BLD AUTO: 75 %
NRBC # BLD AUTO: 0 10E3/UL
NRBC BLD AUTO-RTO: 0 /100
PERFORMING LABORATORY: NORMAL
PLATELET # BLD AUTO: 439 10E3/UL (ref 150–450)
PROT SERPL-MCNC: 7.3 G/DL (ref 6.4–8.3)
RBC # BLD AUTO: 5.09 10E6/UL (ref 3.8–5.2)
SPECIMEN STATUS: NORMAL
TEST NAME: NORMAL
WBC # BLD AUTO: 9.6 10E3/UL (ref 4–11)

## 2025-04-08 PROCEDURE — 82784 ASSAY IGA/IGD/IGG/IGM EACH: CPT

## 2025-04-08 PROCEDURE — 36415 COLL VENOUS BLD VENIPUNCTURE: CPT

## 2025-04-08 PROCEDURE — 82040 ASSAY OF SERUM ALBUMIN: CPT

## 2025-04-08 PROCEDURE — 85025 COMPLETE CBC W/AUTO DIFF WBC: CPT

## 2025-04-09 LAB
IGA SERPL-MCNC: 121 MG/DL (ref 84–499)
IGG SERPL-MCNC: 911 MG/DL (ref 610–1616)
IGM SERPL-MCNC: 79 MG/DL (ref 35–242)

## 2025-04-10 LAB — LABCORP INTERFACED MISCELLANEOUS TEST RESULT: NORMAL
